# Patient Record
Sex: MALE | Race: BLACK OR AFRICAN AMERICAN | Employment: OTHER | URBAN - METROPOLITAN AREA
[De-identification: names, ages, dates, MRNs, and addresses within clinical notes are randomized per-mention and may not be internally consistent; named-entity substitution may affect disease eponyms.]

---

## 2017-12-16 ENCOUNTER — HOSPITAL ENCOUNTER (OUTPATIENT)
Dept: CT IMAGING | Facility: HOSPITAL | Age: 77
Discharge: HOME | End: 2017-12-16
Attending: NURSE PRACTITIONER
Payer: MEDICARE

## 2017-12-16 DIAGNOSIS — G40.309 GENERALIZED CONVULSIVE EPILEPSY (HCC): ICD-10-CM

## 2017-12-16 DIAGNOSIS — R41.0 CONFUSION: ICD-10-CM

## 2017-12-16 PROCEDURE — 70450 CT HEAD/BRAIN W/O DYE: CPT | Performed by: NURSE PRACTITIONER

## 2018-01-09 PROCEDURE — 84153 ASSAY OF PSA TOTAL: CPT | Performed by: INTERNAL MEDICINE

## 2018-01-09 PROCEDURE — 84154 ASSAY OF PSA FREE: CPT | Performed by: INTERNAL MEDICINE

## 2018-01-09 PROCEDURE — 80177 DRUG SCRN QUAN LEVETIRACETAM: CPT | Performed by: NURSE PRACTITIONER

## 2018-03-03 ENCOUNTER — HOSPITAL ENCOUNTER (OUTPATIENT)
Dept: CV DIAGNOSTICS | Facility: HOSPITAL | Age: 78
Discharge: HOME | End: 2018-03-03
Attending: INTERNAL MEDICINE
Payer: MEDICARE

## 2018-03-03 DIAGNOSIS — I35.0 NONRHEUMATIC AORTIC VALVE STENOSIS: ICD-10-CM

## 2018-03-03 PROCEDURE — 93306 TTE W/DOPPLER COMPLETE: CPT | Performed by: INTERNAL MEDICINE

## 2018-03-09 NOTE — PROGRESS NOTES
Called patient [phone] (home)   Spoke to patient daughter and guardian Rosendo Mortensen (ok per hipaa)  Good understanding verbalized  Number given to schedule with cardiology

## 2018-04-08 ENCOUNTER — APPOINTMENT (OUTPATIENT)
Dept: GENERAL RADIOLOGY | Facility: HOSPITAL | Age: 78
End: 2018-04-08
Attending: EMERGENCY MEDICINE
Payer: MEDICARE

## 2018-04-08 ENCOUNTER — HOSPITAL ENCOUNTER (OUTPATIENT)
Facility: HOSPITAL | Age: 78
Setting detail: OBSERVATION
Discharge: HOME HEALTH | End: 2018-04-10
Attending: EMERGENCY MEDICINE | Admitting: INTERNAL MEDICINE
Payer: MEDICARE

## 2018-04-08 DIAGNOSIS — I48.91 ATRIAL FIBRILLATION WITH RVR (HCC): Primary | ICD-10-CM

## 2018-04-08 PROCEDURE — 71045 X-RAY EXAM CHEST 1 VIEW: CPT | Performed by: EMERGENCY MEDICINE

## 2018-04-08 RX ORDER — WARFARIN SODIUM 2.5 MG/1
1.25 TABLET ORAL
Status: COMPLETED | OUTPATIENT
Start: 2018-04-08 | End: 2018-04-08

## 2018-04-08 RX ORDER — PANTOPRAZOLE SODIUM 20 MG/1
20 TABLET, DELAYED RELEASE ORAL
Status: DISCONTINUED | OUTPATIENT
Start: 2018-04-09 | End: 2018-04-10

## 2018-04-08 RX ORDER — LORAZEPAM 0.5 MG/1
0.5 TABLET ORAL 2 TIMES DAILY PRN
Status: DISCONTINUED | OUTPATIENT
Start: 2018-04-08 | End: 2018-04-10

## 2018-04-08 RX ORDER — FOLIC ACID 1 MG/1
1 TABLET ORAL
Status: DISCONTINUED | OUTPATIENT
Start: 2018-04-09 | End: 2018-04-10

## 2018-04-08 RX ORDER — ACETAMINOPHEN 160 MG
2000 TABLET,DISINTEGRATING ORAL DAILY
Status: DISCONTINUED | OUTPATIENT
Start: 2018-04-09 | End: 2018-04-10

## 2018-04-08 RX ORDER — RIVASTIGMINE 9.5 MG/24H
1 PATCH, EXTENDED RELEASE TRANSDERMAL DAILY
Status: DISCONTINUED | OUTPATIENT
Start: 2018-04-09 | End: 2018-04-10

## 2018-04-08 RX ORDER — ONDANSETRON 2 MG/ML
4 INJECTION INTRAMUSCULAR; INTRAVENOUS EVERY 6 HOURS PRN
Status: DISCONTINUED | OUTPATIENT
Start: 2018-04-08 | End: 2018-04-10

## 2018-04-08 RX ORDER — ACETAMINOPHEN 325 MG/1
650 TABLET ORAL EVERY 6 HOURS PRN
Status: DISCONTINUED | OUTPATIENT
Start: 2018-04-08 | End: 2018-04-10

## 2018-04-08 RX ORDER — ASPIRIN 81 MG/1
81 TABLET, CHEWABLE ORAL DAILY
Status: DISCONTINUED | OUTPATIENT
Start: 2018-04-08 | End: 2018-04-10

## 2018-04-08 RX ORDER — CALCIUM CARBONATE 200(500)MG
500 TABLET,CHEWABLE ORAL DAILY PRN
Status: DISCONTINUED | OUTPATIENT
Start: 2018-04-08 | End: 2018-04-10

## 2018-04-08 RX ORDER — LEVETIRACETAM 500 MG/1
500 TABLET ORAL 2 TIMES DAILY
Status: DISCONTINUED | OUTPATIENT
Start: 2018-04-08 | End: 2018-04-10

## 2018-04-08 NOTE — ED NOTES
Assuming care of pt at this time. Pt receiving a 250mls 0.9NS bolus. Cardizem gtts infusing at 2.5 mg/hr. Pt A/Ox2 at this time. Daughter at bedside sts this is normal for pt. Pt has dementia, lives at home with daughter.  Daughter sts SBP was in the 80s at

## 2018-04-08 NOTE — ED PROVIDER NOTES
Patient Seen in: [facility] Emergency Department    History   Patient presents with:  Hypotension (cardiovascular)    Stated Complaint:  LOW BLOOD PRESSURE    HPI    Consuelo Swanson is a pleasant 51-year-old male coming with complaints of low blood pressure. exam shows tachycardia abdomen soft nontender tremors cyanosis or edema no rash       ED Course     Labs Reviewed   COMP METABOLIC PANEL (14) - Abnormal; Notable for the following:        Result Value    Glucose 199 (*)     BUN 24 (*)     Creatinine 10.10 bolus as well as starting Cardizem with a small bolus and patient has since converted.   Patient has no history of atrial fibrillation patient will be admitted at this time and was discussed with cardiology      Admission disposition: 4/8/2018  5:55 PM

## 2018-04-08 NOTE — ED NOTES
Round on pt. Pt and family updated on room number and eta to floor. No distress noted. Pt requests food tray. MD approves pt to eat. Food tray ordered.

## 2018-04-09 PROCEDURE — 99222 1ST HOSP IP/OBS MODERATE 55: CPT | Performed by: INTERNAL MEDICINE

## 2018-04-09 RX ORDER — METOPROLOL TARTRATE 50 MG/1
50 TABLET, FILM COATED ORAL
Status: DISCONTINUED | OUTPATIENT
Start: 2018-04-09 | End: 2018-04-09

## 2018-04-09 RX ORDER — HEPARIN SODIUM 1000 [USP'U]/ML
1.5 INJECTION, SOLUTION INTRAVENOUS; SUBCUTANEOUS ONCE
Status: DISCONTINUED | OUTPATIENT
Start: 2018-04-09 | End: 2018-04-09

## 2018-04-09 RX ORDER — LIDOCAINE AND PRILOCAINE 25; 25 MG/G; MG/G
CREAM TOPICAL AS NEEDED
Status: DISCONTINUED | OUTPATIENT
Start: 2018-04-09 | End: 2018-04-10

## 2018-04-09 RX ORDER — WARFARIN SODIUM 2.5 MG/1
2.5 TABLET ORAL
Status: COMPLETED | OUTPATIENT
Start: 2018-04-09 | End: 2018-04-09

## 2018-04-09 RX ORDER — VERAPAMIL HYDROCHLORIDE 2.5 MG/ML
5 INJECTION, SOLUTION INTRAVENOUS ONCE
Status: COMPLETED | OUTPATIENT
Start: 2018-04-09 | End: 2018-04-09

## 2018-04-09 RX ORDER — ALBUMIN (HUMAN) 12.5 G/50ML
100 SOLUTION INTRAVENOUS AS NEEDED
Status: DISCONTINUED | OUTPATIENT
Start: 2018-04-09 | End: 2018-04-10

## 2018-04-09 NOTE — H&P
Patient presents with:  Hypotension (cardiovascular)       PCP: Brionna Hameed MD      History of Present Illness: Patient is a 78year old male with PMH sig for hx of ESRD on HD, hx of DVT on coumadin, Dementia, DM, GERD, hx of Valvular heart diseas Smoking status: Never Smoker    Smokeless tobacco: Never Used    Alcohol use No        Fam Hx  Family History   Problem Relation Age of Onset   • Hypertension Father        Review of Systems  Comprehensive ROS reviewed and negative except for what i ago  - controlled on keppra       FEN:  - General diet  - Lytes prn    Proph:  - DVT proph with pt on coumadin     Dispo:  - [facility] hospitalist service  - Consults include: cardiology       [facility] hospitalist to continue to follow patient while in house    Full

## 2018-04-09 NOTE — CONSULTS
[facility] Consultation    Lincoln Gonzalez.  Patient Status:  Observation    10/18/1940 MRN [id number]   [facility] 7NE-A Attending Jeannette Bui MD   Hosp Day # 0 PCP Margarita Lowery MD     Reason for Consultation:  Atrial Fibr drugs.    Review of Systems:  All systems were reviewed and are negative except as described above in HPI.     Physical Exam:      Wt Readings from Last 3 Encounters:  04/08/18 : 135 lb (61.2 kg)  03/27/18 : 135 lb (61.2 kg)  02/15/18 : 143 lb (64.9 kg) Elevation the right hemidiaphragm. Mild right basilar atelectasis. Cardiac silhouette is upper normal in size. Aortic atherosclerosis. Ectatic and tortuous thoracic aorta, unchanged. Minimal blunting of the right costophrenic angle.   No   measurable p

## 2018-04-09 NOTE — PLAN OF CARE
Assumed care at 0700. A/o x 2-3, forgetful, nonsensical at times. RA  NSR on tele, @ 1020 pt went into SVT, sustaining, w/ HR in 150's. /79. Asymptomatic. EKG confirmed, on chart.    Delmis WORRELL notified, received call from Dr. Jaleesa Ospina to give

## 2018-04-09 NOTE — HOME CARE LIAISON
I met with patient and family at bedside. Care discussed with patient and daughter Baron Gong. Has history with [facility] and agreeable to another episode. Requesting [facility] for physical therapy.   Thank you for the referral.

## 2018-04-09 NOTE — CONSULTS
[facility] Dosing Service  Warfarin (Coumadin) Initial Dosing    Nora Grajeda is a 78year old male for whom pharmacy has been consulted to dose warfarin (COUMADIN) for Prophylaxis of venous thrombosis by Dr. Lisa Leach.   Based on this indicatio

## 2018-04-09 NOTE — PROGRESS NOTES
[facility] Dosing Service  Warfarin (Coumadin) Subsequent Dosing    Yolettemartantonio Anderson. is a 78year old male for whom pharmacy has been dosing warfarin (Coumadin).  Goal INR is 2-3    Recent Labs   Lab  04/08/18   1635  04/09/18   0514   INR  2.51*

## 2018-04-09 NOTE — CONSULTS
[facility]  Report of Consultation    Macey Larose.  Patient Status:  Observation    10/18/1940 MRN [id number]   [facility] 7NE-A Attending Blaine Abarca MD   Hosp Day # 0 PCP Daija Vargas MD     Reason for Consultation: 2.5 mg, Oral, Once at night  •  aspirin chewable tab 81 mg, 81 mg, Oral, Daily  •  Calcium Carbonate Antacid (TUMS) chewable tab 500 mg, 500 mg, Oral, Daily PRN  •  Vitamin D3 (VITAMIN D3) cap 2,000 Units, 2,000 Units, Oral, Daily  •  folic acid (FOLVITE) 0.5 04/08/2018   TP 8.0 04/08/2018   AST 25 04/08/2018   ALT 13 04/08/2018   PTT 52.4 04/08/2018   INR 2.65 04/09/2018   PTP 29.1 04/09/2018   TSH 1.620 04/09/2018   TROP <0.046 04/08/2018            BUN (mg/dL)   Date Value   04/09/2018 28 (H)   04/08/201

## 2018-04-09 NOTE — ED NOTES
Report given to [facility] . Will send for transport at [address]. Round on pt. Updated on eta to floor.  Food tray delivered to pt

## 2018-04-09 NOTE — PLAN OF CARE
NURSING ADMISSION NOTE  Jinny Lam.  10/18/1940    Patient admitted via [address] to room. Safety precautions initiated. Bed in low position. Call light in reach.   /59 (BP Location: Left arm)   Pulse 69   Temp 97.9 °F (36.6 °C

## 2018-04-09 NOTE — CM/SW NOTE
Pt is a 79 yo male admitted for low BP and afib w/RVR. Pt lives with his dtr in [facility]. Pt goes to HD for ESRD to [facility] on MWF. Pt has a history with [facility] . Pt would like [facility] again - referral made to [facility].   They can acce

## 2018-04-10 VITALS
RESPIRATION RATE: 18 BRPM | HEART RATE: 86 BPM | WEIGHT: 142.19 LBS | SYSTOLIC BLOOD PRESSURE: 119 MMHG | BODY MASS INDEX: 24 KG/M2 | DIASTOLIC BLOOD PRESSURE: 67 MMHG | TEMPERATURE: 97 F | OXYGEN SATURATION: 100 %

## 2018-04-10 RX ORDER — WARFARIN SODIUM 2.5 MG/1
1.25 TABLET ORAL
Status: DISCONTINUED | OUTPATIENT
Start: 2018-04-10 | End: 2018-04-10

## 2018-04-10 NOTE — PROGRESS NOTES
[facility]  Progress Note    Gloria Edwar.  Patient Status:  Observation    10/18/1940 MRN [id number]   [facility] 7NE-A Attending Kana Officer, MD   Hosp Day # 0 PCP Tito Carter MD     Had HD late into the morning today mood and affect.     Recent Labs   04/08/18  1635 04/09/18  0514   WBC 5.9 5.9   HGB 11.1* 10.0*   MCV 94.8 92.6   .0 228.0   INR 2.51* 2.65*         Recent Labs   04/08/18  1635 04/09/18  0514 04/10/18  0455    140 139   K 4.0 4.0 3.5*   CL 97

## 2018-04-10 NOTE — PLAN OF CARE
Assumed care at 0730. Pt A&O x2. On room air. NSR on tele. PRN ativan given for anxiety this am. Ambulated in hallway with PT. R arm precautions for AV fistula. K 3.5 this morning, Dr. Milla Rock notified- no new orders. Sz precautions maintained.  Safety preca

## 2018-04-10 NOTE — PLAN OF CARE
Patient alert and oriented times 2-3 at times. Confused at times at night. Reoriented to his surroundings. Kept pulling tele monitor off. Patient reoriented easily. Dialysis from 8:30 till about 1:30am.  1.2L taken off. Meds given per MAR.   Bed in lo

## 2018-04-10 NOTE — PROGRESS NOTES
[facility] Cardiology Progress Note        Macey Larose.  Patient Status:  Observation    10/18/1940 MRN [id number]   [facility] 7NE-A Attending Blaine Abarca MD   Hosp Day # 0 PCP Daija Vargas MD     Sub murmur. Lungs: Clear to auscultation and percussion. Abdomen: Soft, non-tender. Extremities: No LE edema. No clubbing or cyanosis. Neurologic: Alert and oriented, normal affect. Skin: Warm and dry.            LABS:      HEM:  Recent Labs   Lab  04/ (S, est): 50mm Hg. 3. Aortic valve: Mild calcified and thickened trileaflet aortic valve with     mild stenosis. Mild regurgitation. Peak velocity (S): 2.6m/sec. Mean     gradient (S): 13mm Hg. Valve area (VTI): 1.39cm^2.  4. Mitral valve:  Moderately calc

## 2018-04-10 NOTE — CONSULTS
[facility] Electrophysiology Consult      Carline Damon.  Patient Status:  Observation    10/18/1940 MRN [id number]   [facility] 7NE-A Attending Blanquita Gloria MD   Hosp Day # 0 PCP MD Leonardo Kaufman Tab TAKE ONE OR ONE AND ONE-HALF TABLETS DAILY AS DIRECTED Disp: 145 tablet Rfl: 1   RIVASTIGMINE 9.5 MG/24HR Transdermal Patch 24 Hr APPLY 1 PATCH TO SKIN DAILY Disp: 90 patch Rfl: 1   OMEPRAZOLE 20 MG Oral Capsule Delayed Release TAKE 1 CAPSULE EVERY MOR no JVD, no carotid bruits, no thyromegaly  RESPIRATORY: clear to auscultation  CARDIOVASCULAR: S1, S2 normal, RRR; no S3, no S4; no click; no murmur  ABDOMEN: normal active BS, soft, nondistended; nontender  EXTREMITIES: no cyanosis, clubbing or edema, per observation and Valsalva, daily medications, and catheter ablation.   · He would like to continue medications  · Verapamil and metoprolol  · In discussing with daughter at bedside, plan 30d event monitor to ensure he is not having episodes          Jolly Woody

## 2018-04-10 NOTE — PHYSICAL THERAPY NOTE
PHYSICAL THERAPY QUICK EVALUATION - INPATIENT    Room Number: 7102/0015-M  Evaluation Date: 4/10/2018  Presenting Problem: A-fib with RVR  Physician Order: PT Eval and Treat     Pt is 78year old male admitted on 4/8/2018 from home with c/o new onset PSV patient currently have. ..  -   Turning over in bed (including adjusting bedclothes, sheets and blankets)?: None   -   Sitting down on and standing up from a chair with arms (e.g., wheelchair, bedside commode, etc.): A Little   -   Moving from lying on back Therapy needs at this time. Patient discharged from Physical Therapy services. Please re-order if a new functional limitation presents during this admission. GOALS  Patient was able to achieve the following goals . ..     Patient was able to transfer At

## 2018-04-11 NOTE — CM/SW NOTE
04/11/18 0900   Discharge disposition   Expected discharge disposition Home-Health   Name of Facillity/Home Care/Hospice [facility]   Home services after discharge Skilled home care   Discharge transportation Private car

## 2018-04-11 NOTE — OCCUPATIONAL THERAPY NOTE
OCCUPATIONAL THERAPY QUICK EVALUATION - INPATIENT    Room Number: 2369/6813-H  Evaluation Date: 4/11/2018     Type of Evaluation: Quick Eval  Presenting Problem: A-fib    Physician Order: IP Consult to Occupational Therapy  Reason for Therapy:  ADL/IADL Dy Impaired safety awareness.      RANGE OF MOTION AND STRENGTH ASSESSMENT  Upper extremity ROM is within functional limits     Upper extremity strength is within functional limits     NEUROLOGICAL FINDINGS                   ACTIVITIES OF DAILY LIVING ASSESSME records    Specific performance deficits impacting engagement in ADL/IADL  LOW  1 - 3 performance deficits    Client Assessment/Performance Deficits  LOW - No comorbidities nor modifications of tasks    Clinical Decision Making  LOW - Analysis of occupatio

## 2018-04-18 NOTE — DISCHARGE SUMMARY
General Medicine Discharge Summary     Patient ID:  Ciro Burnett.  78year old  10/18/1940    Admit date: 4/8/2018    Discharge date and time: 4/10/2018  4:18 PM     Attending Physician: Татьяна bourne Consults: IP CONSULT TO NEPHROLOGY  IP CONSULT TO PHYSICAL THERAPY  IP CONSULT TO PHYSICAL THERAPY  IP CONSULT TO SOCIAL WORK    Operative Procedures:        Patient instructions:      Discharge Medication List as of 4/10/2018  3:38 PM    START taking BS  Extremities: no pedal edema   Neuro: CN inact, no focal deficits      Total time coordinating care for discharge: 39 min     Sonido Tolentino MD  [facility] Hospitalist  [phone]

## 2018-07-11 ENCOUNTER — APPOINTMENT (OUTPATIENT)
Dept: GENERAL RADIOLOGY | Facility: HOSPITAL | Age: 78
End: 2018-07-11
Attending: EMERGENCY MEDICINE
Payer: MEDICARE

## 2018-07-11 ENCOUNTER — HOSPITAL ENCOUNTER (OUTPATIENT)
Facility: HOSPITAL | Age: 78
Setting detail: OBSERVATION
LOS: 1 days | Discharge: HOME | End: 2018-07-12
Attending: EMERGENCY MEDICINE | Admitting: INTERNAL MEDICINE
Payer: MEDICARE

## 2018-07-11 DIAGNOSIS — I47.1 SVT (SUPRAVENTRICULAR TACHYCARDIA) (HCC): Primary | ICD-10-CM

## 2018-07-11 DIAGNOSIS — I95.9 HYPOTENSION, UNSPECIFIED HYPOTENSION TYPE: ICD-10-CM

## 2018-07-11 PROBLEM — D64.9 ANEMIA: Status: ACTIVE | Noted: 2018-07-11

## 2018-07-11 PROBLEM — N17.9 ACUTE KIDNEY INJURY (HCC): Status: ACTIVE | Noted: 2018-07-11

## 2018-07-11 PROBLEM — N17.9 ACUTE RENAL FAILURE (ARF) (HCC): Status: ACTIVE | Noted: 2018-07-11

## 2018-07-11 PROBLEM — R73.9 HYPERGLYCEMIA: Status: ACTIVE | Noted: 2018-07-11

## 2018-07-11 LAB
ALBUMIN SERPL-MCNC: 3.3 G/DL (ref 3.5–4.8)
ALP LIVER SERPL-CCNC: 74 U/L (ref 45–117)
ALT SERPL-CCNC: 15 U/L (ref 17–63)
APTT PPP: 36.6 SECONDS (ref 26.1–34.6)
AST SERPL-CCNC: 36 U/L (ref 15–41)
BASOPHILS # BLD AUTO: 0.05 X10(3) UL (ref 0–0.1)
BASOPHILS NFR BLD AUTO: 0.9 %
BILIRUB SERPL-MCNC: 0.7 MG/DL (ref 0.1–2)
BUN BLD-MCNC: 20 MG/DL (ref 8–20)
CALCIUM BLD-MCNC: 8.7 MG/DL (ref 8.3–10.3)
CHLORIDE: 100 MMOL/L (ref 101–111)
CO2: 22 MMOL/L (ref 22–32)
CREAT BLD-MCNC: 9.67 MG/DL (ref 0.7–1.3)
EOSINOPHIL # BLD AUTO: 0.15 X10(3) UL (ref 0–0.3)
EOSINOPHIL NFR BLD AUTO: 2.6 %
ERYTHROCYTE [DISTWIDTH] IN BLOOD BY AUTOMATED COUNT: 15.3 % (ref 11.5–16)
GLUCOSE BLD-MCNC: 144 MG/DL (ref 70–99)
HCT VFR BLD AUTO: 28.6 % (ref 37–53)
HGB BLD-MCNC: 9.1 G/DL (ref 13–17)
IMMATURE GRANULOCYTE COUNT: 0.03 X10(3) UL (ref 0–1)
IMMATURE GRANULOCYTE RATIO %: 0.5 %
INR BLD: 1.85 (ref 0.9–1.1)
LYMPHOCYTES # BLD AUTO: 2.33 X10(3) UL (ref 0.9–4)
LYMPHOCYTES NFR BLD AUTO: 40.3 %
M PROTEIN MFR SERPL ELPH: 8.3 G/DL (ref 6.1–8.3)
MCH RBC QN AUTO: 28.1 PG (ref 27–33.2)
MCHC RBC AUTO-ENTMCNC: 31.8 G/DL (ref 31–37)
MCV RBC AUTO: 88.3 FL (ref 80–99)
MONOCYTES # BLD AUTO: 0.45 X10(3) UL (ref 0.1–1)
MONOCYTES NFR BLD AUTO: 7.8 %
NEUTROPHIL ABS PRELIM: 2.77 X10 (3) UL (ref 1.3–6.7)
NEUTROPHILS # BLD AUTO: 2.77 X10(3) UL (ref 1.3–6.7)
NEUTROPHILS NFR BLD AUTO: 47.9 %
PLATELET # BLD AUTO: 283 10(3)UL (ref 150–450)
POTASSIUM SERPL-SCNC: 4.7 MMOL/L (ref 3.6–5.1)
PSA SERPL DL<=0.01 NG/ML-MCNC: 22 SECONDS (ref 12.4–14.7)
RBC # BLD AUTO: 3.24 X10(6)UL (ref 3.8–5.8)
RED CELL DISTRIBUTION WIDTH-SD: 49 FL (ref 35.1–46.3)
SODIUM SERPL-SCNC: 136 MMOL/L (ref 136–144)
TROPONIN: <0.046 NG/ML (ref ?–0.05)
TSI SER-ACNC: 2.34 MIU/ML (ref 0.35–5.5)
WBC # BLD AUTO: 5.8 X10(3) UL (ref 4–13)

## 2018-07-11 PROCEDURE — 71045 X-RAY EXAM CHEST 1 VIEW: CPT | Performed by: EMERGENCY MEDICINE

## 2018-07-11 RX ORDER — LEVETIRACETAM 250 MG/1
250 TABLET ORAL 2 TIMES DAILY
Status: DISCONTINUED | OUTPATIENT
Start: 2018-07-12 | End: 2018-07-12

## 2018-07-11 RX ORDER — PANTOPRAZOLE SODIUM 20 MG/1
20 TABLET, DELAYED RELEASE ORAL
Status: DISCONTINUED | OUTPATIENT
Start: 2018-07-12 | End: 2018-07-12

## 2018-07-11 RX ORDER — WARFARIN SODIUM 2.5 MG/1
2.5 TABLET ORAL NIGHTLY
Status: DISCONTINUED | OUTPATIENT
Start: 2018-07-12 | End: 2018-07-12

## 2018-07-11 RX ORDER — LORAZEPAM 0.5 MG/1
0.5 TABLET ORAL EVERY 6 HOURS PRN
Status: DISCONTINUED | OUTPATIENT
Start: 2018-07-11 | End: 2018-07-12

## 2018-07-11 RX ORDER — ASPIRIN 81 MG/1
81 TABLET, CHEWABLE ORAL
Status: DISCONTINUED | OUTPATIENT
Start: 2018-07-12 | End: 2018-07-12

## 2018-07-11 RX ORDER — BISACODYL 10 MG
10 SUPPOSITORY, RECTAL RECTAL
Status: DISCONTINUED | OUTPATIENT
Start: 2018-07-11 | End: 2018-07-12

## 2018-07-11 RX ORDER — POLYETHYLENE GLYCOL 3350 17 G/17G
17 POWDER, FOR SOLUTION ORAL DAILY PRN
Status: DISCONTINUED | OUTPATIENT
Start: 2018-07-11 | End: 2018-07-12

## 2018-07-11 RX ORDER — DILTIAZEM HYDROCHLORIDE 5 MG/ML
5 INJECTION INTRAVENOUS ONCE
Status: COMPLETED | OUTPATIENT
Start: 2018-07-11 | End: 2018-07-11

## 2018-07-11 RX ORDER — ONDANSETRON 2 MG/ML
4 INJECTION INTRAMUSCULAR; INTRAVENOUS EVERY 4 HOURS PRN
Status: DISCONTINUED | OUTPATIENT
Start: 2018-07-11 | End: 2018-07-12

## 2018-07-11 RX ORDER — RIVASTIGMINE 9.5 MG/24H
1 PATCH, EXTENDED RELEASE TRANSDERMAL DAILY
Status: DISCONTINUED | OUTPATIENT
Start: 2018-07-12 | End: 2018-07-12

## 2018-07-11 RX ORDER — ACETAMINOPHEN 325 MG/1
650 TABLET ORAL EVERY 6 HOURS PRN
Status: DISCONTINUED | OUTPATIENT
Start: 2018-07-11 | End: 2018-07-12

## 2018-07-11 RX ORDER — DOCUSATE SODIUM 100 MG/1
100 CAPSULE, LIQUID FILLED ORAL 2 TIMES DAILY
Status: DISCONTINUED | OUTPATIENT
Start: 2018-07-11 | End: 2018-07-12

## 2018-07-11 NOTE — ED NOTES
Daughter now at bedside. Updated on plan of care. Blood pressure improving. She states he has a history of dementia and is at his neurologic baseline. Patient appears comfortable at this time.

## 2018-07-11 NOTE — ED NOTES
MD performed carotid massage and patient had compensatory pause and converted to sinus rhythm. BP 95/54. Patient asymptomatic at this time. HR now in the 80s. Will continue to monitor.

## 2018-07-11 NOTE — ED INITIAL ASSESSMENT (HPI)
Patient presents with elevated heart rate and low blood pressure. He is coming from dialysis. He is dialyzed M/W/F. Blood pressure for /70. BP on arrival 93/61.  and regular.

## 2018-07-11 NOTE — ED NOTES
MD at bedside for hypotension. Patient is alert and oriented but forgetful and says inappropriate things like he is 600 lbs. Unable to answer certain questions correctly. Denies any symptoms at this time. MD wants fluid bolus and to continue with cardizem.

## 2018-07-11 NOTE — ED PROVIDER NOTES
Patient Seen in: [facility] Emergency Department    History   Patient presents with:  Hypotension (cardiovascular)    Stated Complaint: low BP at dialysis?  /70 for EMS    HPI    Patient is a pleasant 43-year-old male, with multiple past medica 133/81   Pulse 84   Temp 97.4 °F (36.3 °C) (Temporal)   Resp 15   Ht 165.1 cm (5' 5\")   Wt 60 kg   SpO2 91%   BMI 22.01 kg/m²       Physical Exam    Vital signs noted.    GENERAL: Patient is awake and alert, resting comfortably on the cart, in no apparent orders were created for panel order CBC WITH DIFFERENTIAL WITH PLATELET.   Procedure                               Abnormality         Status                     ---------                               -----------         ------                     CBC W/ D studies were obtained. Cardizem drip ordered. Cardizem drip was initiated. Carotid massage performed. Patient spontaneously converted to sinus rhythm. Blood pressure remained tentative. Cardizem drip held.     Results of the patient's laboratory and

## 2018-07-12 VITALS
HEART RATE: 86 BPM | BODY MASS INDEX: 22.6 KG/M2 | TEMPERATURE: 98 F | WEIGHT: 135.63 LBS | OXYGEN SATURATION: 96 % | SYSTOLIC BLOOD PRESSURE: 124 MMHG | RESPIRATION RATE: 19 BRPM | DIASTOLIC BLOOD PRESSURE: 77 MMHG | HEIGHT: 65 IN

## 2018-07-12 PROBLEM — N18.6 ANEMIA IN ESRD (END-STAGE RENAL DISEASE) (HCC): Status: ACTIVE | Noted: 2018-07-11

## 2018-07-12 PROBLEM — D63.1 ANEMIA IN ESRD (END-STAGE RENAL DISEASE) (HCC): Status: ACTIVE | Noted: 2018-07-11

## 2018-07-12 LAB
ATRIAL RATE: 141 BPM
BASOPHILS # BLD AUTO: 0.05 X10(3) UL (ref 0–0.1)
BASOPHILS NFR BLD AUTO: 0.8 %
BUN BLD-MCNC: 24 MG/DL (ref 8–20)
CALCIUM BLD-MCNC: 8.3 MG/DL (ref 8.3–10.3)
CHLORIDE: 102 MMOL/L (ref 101–111)
CO2: 26 MMOL/L (ref 22–32)
CREAT BLD-MCNC: 10.1 MG/DL (ref 0.7–1.3)
EOSINOPHIL # BLD AUTO: 0.22 X10(3) UL (ref 0–0.3)
EOSINOPHIL NFR BLD AUTO: 3.4 %
ERYTHROCYTE [DISTWIDTH] IN BLOOD BY AUTOMATED COUNT: 15.2 % (ref 11.5–16)
GLUCOSE BLD-MCNC: 109 MG/DL (ref 70–99)
HAV IGM SER QL: 2.1 MG/DL (ref 1.8–2.5)
HCT VFR BLD AUTO: 29.5 % (ref 37–53)
HGB BLD-MCNC: 9.1 G/DL (ref 13–17)
IMMATURE GRANULOCYTE COUNT: 0.02 X10(3) UL (ref 0–1)
IMMATURE GRANULOCYTE RATIO %: 0.3 %
INR BLD: 1.94 (ref 0.9–1.1)
LYMPHOCYTES # BLD AUTO: 2.61 X10(3) UL (ref 0.9–4)
LYMPHOCYTES NFR BLD AUTO: 40.8 %
MCH RBC QN AUTO: 27.4 PG (ref 27–33.2)
MCHC RBC AUTO-ENTMCNC: 30.8 G/DL (ref 31–37)
MCV RBC AUTO: 88.9 FL (ref 80–99)
MONOCYTES # BLD AUTO: 0.44 X10(3) UL (ref 0.1–1)
MONOCYTES NFR BLD AUTO: 6.9 %
NEUTROPHIL ABS PRELIM: 3.06 X10 (3) UL (ref 1.3–6.7)
NEUTROPHILS # BLD AUTO: 3.06 X10(3) UL (ref 1.3–6.7)
NEUTROPHILS NFR BLD AUTO: 47.8 %
PLATELET # BLD AUTO: 231 10(3)UL (ref 150–450)
POTASSIUM SERPL-SCNC: 4.3 MMOL/L (ref 3.6–5.1)
PSA SERPL DL<=0.01 NG/ML-MCNC: 21.8 SECONDS (ref 12–14.1)
Q-T INTERVAL: 348 MS
QRS DURATION: 114 MS
QTC CALCULATION (BEZET): 531 MS
R AXIS: -35 DEGREES
RBC # BLD AUTO: 3.32 X10(6)UL (ref 3.8–5.8)
RED CELL DISTRIBUTION WIDTH-SD: 50 FL (ref 35.1–46.3)
SODIUM SERPL-SCNC: 138 MMOL/L (ref 136–144)
T AXIS: 125 DEGREES
VENTRICULAR RATE: 140 BPM
WBC # BLD AUTO: 6.4 X10(3) UL (ref 4–13)

## 2018-07-12 PROCEDURE — 99222 1ST HOSP IP/OBS MODERATE 55: CPT | Performed by: INTERNAL MEDICINE

## 2018-07-12 PROCEDURE — 5A1D70Z PERFORMANCE OF URINARY FILTRATION, INTERMITTENT, LESS THAN 6 HOURS PER DAY: ICD-10-PCS | Performed by: EMERGENCY MEDICINE

## 2018-07-12 RX ORDER — METOPROLOL TARTRATE 50 MG/1
50 TABLET, FILM COATED ORAL
Status: DISCONTINUED | OUTPATIENT
Start: 2018-07-12 | End: 2018-07-12

## 2018-07-12 NOTE — H&P
[facility] Hospitalist History and Physical      CC: SVT    PCP: Ade Dimas MD    History of Present Illness: Patient is a 78year old male with PMH sig for ESRD, DM, SVT here after noted to have low BP at HD. Found to be in SVT in the ER.  Treated with ca TO SKIN DAILY Disp: 90 patch Rfl: 1   metoprolol Tartrate 25 MG Oral Tab Take 1 tablet (25 mg total) by mouth 2 (two) times daily.  Disp: 180 tablet Rfl: 3   Calcium Carbonate Antacid 500 MG Oral Chew Tab Chew 1 tablet by mouth daily as needed for Heartburn 07/12/2018   CA 8.3 07/12/2018   ALB 3.3 07/11/2018   ALKPHO 74 07/11/2018   BILT 0.7 07/11/2018   TP 8.3 07/11/2018   AST 36 07/11/2018   ALT 15 07/11/2018   PTT 36.6 07/11/2018   INR 1.94 07/12/2018   PTP 21.8 07/12/2018   TSH 2.340 07/11/2018   MG 2.1 0

## 2018-07-12 NOTE — OCCUPATIONAL THERAPY NOTE
IP OT attempt to see patient for therapeutic assessment/treatment. Patient has yet to be assessed by Cardiology or Hospitalist.  Will HOLD at this time and attempt again as able.     Cathy Li, HANNAH, OTR/L  Master of Occupational Therapy  Occupational

## 2018-07-12 NOTE — PLAN OF CARE
CARDIOVASCULAR - ADULT    • Maintains optimal cardiac output and hemodynamic stability Progressing    • Absence of cardiac arrhythmias or at baseline Progressing        Cardiac monitor shows. ... SR BBB  Alert and o x 1-2 , Hx of Dementia  /81.  HR at 7

## 2018-07-12 NOTE — PHYSICAL THERAPY NOTE
Orders received for PT evaluation via mobility screening. No H&P in chart. Cardiology consult pending. Will hold PT at this time follow-up as appropriate.

## 2018-07-12 NOTE — PROGRESS NOTES
[address] Cardiology  Report of Consultation    Cleveland Mcdermotto.  Patient Status:  Observation    10/18/1940 MRN [id number]   [facility] 8NE-A Attending Ron Pierce,*   Hosp Day # 1 PCP Roque Bowser MD Warfarin Sodium  2.5 mg Oral Nightly       Continuous Infusion:   • diltiazem Stopped (07/11/18 8672)       PRN Medications:   ondansetron HCl, acetaminophen, PEG 3350, bisacodyl, LORazepam    Outpatient Medications:     No current facility-administered me Temp: 98.2 °F (36.8 °C)     Wt Readings from Last 3 Encounters:  07/12/18 : 135 lb 9.6 oz (61.5 kg)  06/12/18 : 141 lb (64 kg)  06/12/18 : 141 lb 1.5 oz (64 kg)          General: Well developed, well nourished male. Pt is in no acute distress.   HEENT: 07/11/18   1704   TROP  <0.046       Diagnostics:   Tele: Sinus. EKG: LBBB. SVT in ER. Echo: Normal EF. Mild AS. RVSP 50mmHg. 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 ESRD  3. HTN  4. PAF (on coumadin)  5. Hx DVT      Plan:  1. SVT: Now sinus.   In

## 2018-07-12 NOTE — CM/SW NOTE
COND 44: Patient failed Inpatient criteria. Second level of review completed and supports Observation. UR committee in agreement. Discussed with Dr Juwan Zendejas approves.

## 2018-07-12 NOTE — PLAN OF CARE
Received patient at . Alert and Oriented x3, is forgetful. Tele Rhythm NSR with BBB. O2 saturation 96% On room air. Breath sounds diminished but clear. Bed is locked and in low position. Dialysis in progress that completed 1750.  Pt is feeling Ok and r

## 2018-07-12 NOTE — CONSULTS
[facility]  Report of Consultation    Naga Coy.  Patient Status:  Inpatient    10/18/1940 MRN [id number]   [facility] 8NE-A Attending Rick Valle, [address] Day # 1 PCP Cathy Acevedo MD     Reason for [facility] injection 4 mg, 4 mg, Intravenous, Q4H PRN  •  acetaminophen (TYLENOL) tab 650 mg, 650 mg, Oral, Q6H PRN  •  docusate sodium (COLACE) cap 100 mg, 100 mg, Oral, BID  •  PEG 3350 (MIRALAX) powder packet 17 g, 17 g, Oral, Daily PRN  •  bisacodyl (DULCOLAX) re Results  Component Value Date   WBC 6.4 07/12/2018   HGB 9.1 07/12/2018   HCT 29.5 07/12/2018   .0 07/12/2018   CREATSERUM 10.10 07/12/2018   BUN 24 07/12/2018    07/12/2018   K 4.3 07/12/2018    07/12/2018   CO2 26.0 07/12/2018   GLU 10 Cardizem and has had stabilization in heart rate and blood pressure. Cardiology is to evaluate. #2.  End-stage renal disease-the patient was not dialyzed yesterday and we will dialyze him today.   He will again receive dialysis tomorrow to maintain his

## 2018-07-13 NOTE — PLAN OF CARE
Pt Ok to go home per cardiology and primary. Will follow up with Dr Fany Izquierdo in 2 weeks and have next dialysis tomorrow. All belongings taken with pt. Pt and daughter verbalize understanding of DC instructions. Pt wheeled off unit with transport.

## 2018-09-25 ENCOUNTER — HOSPITAL ENCOUNTER (EMERGENCY)
Facility: HOSPITAL | Age: 78
Discharge: HOME | End: 2018-09-25
Attending: EMERGENCY MEDICINE
Payer: MEDICARE

## 2018-09-25 ENCOUNTER — APPOINTMENT (OUTPATIENT)
Dept: ULTRASOUND IMAGING | Facility: HOSPITAL | Age: 78
End: 2018-09-25
Attending: EMERGENCY MEDICINE
Payer: MEDICARE

## 2018-09-25 VITALS
TEMPERATURE: 97 F | DIASTOLIC BLOOD PRESSURE: 49 MMHG | WEIGHT: 141.13 LBS | BODY MASS INDEX: 25.01 KG/M2 | HEART RATE: 64 BPM | RESPIRATION RATE: 16 BRPM | HEIGHT: 63 IN | SYSTOLIC BLOOD PRESSURE: 116 MMHG | OXYGEN SATURATION: 99 %

## 2018-09-25 DIAGNOSIS — L03.115 CELLULITIS OF RIGHT LOWER EXTREMITY: Primary | ICD-10-CM

## 2018-09-25 DIAGNOSIS — D64.9 ANEMIA, UNSPECIFIED TYPE: ICD-10-CM

## 2018-09-25 LAB
ANION GAP SERPL CALC-SCNC: 8 MMOL/L (ref 0–18)
BASOPHILS # BLD AUTO: 0.05 X10(3) UL (ref 0–0.1)
BASOPHILS NFR BLD AUTO: 1 %
BUN BLD-MCNC: 9 MG/DL (ref 8–20)
BUN/CREAT SERPL: 1.5 (ref 10–20)
CALCIUM BLD-MCNC: 7.9 MG/DL (ref 8.3–10.3)
CHLORIDE SERPL-SCNC: 95 MMOL/L (ref 101–111)
CO2 SERPL-SCNC: 35 MMOL/L (ref 22–32)
CREAT BLD-MCNC: 6.09 MG/DL (ref 0.7–1.3)
EOSINOPHIL # BLD AUTO: 0.19 X10(3) UL (ref 0–0.3)
EOSINOPHIL NFR BLD AUTO: 3.9 %
ERYTHROCYTE [DISTWIDTH] IN BLOOD BY AUTOMATED COUNT: 17.6 % (ref 11.5–16)
GLUCOSE BLD-MCNC: 189 MG/DL (ref 70–99)
HCT VFR BLD AUTO: 29 % (ref 37–53)
HGB BLD-MCNC: 8.6 G/DL (ref 13–17)
IMMATURE GRANULOCYTE COUNT: 0.02 X10(3) UL (ref 0–1)
IMMATURE GRANULOCYTE RATIO %: 0.4 %
LYMPHOCYTES # BLD AUTO: 1.9 X10(3) UL (ref 0.9–4)
LYMPHOCYTES NFR BLD AUTO: 38.5 %
MCH RBC QN AUTO: 23.3 PG (ref 27–33.2)
MCHC RBC AUTO-ENTMCNC: 29.7 G/DL (ref 31–37)
MCV RBC AUTO: 78.6 FL (ref 80–99)
MONOCYTES # BLD AUTO: 0.48 X10(3) UL (ref 0.1–1)
MONOCYTES NFR BLD AUTO: 9.7 %
NEUTROPHIL ABS PRELIM: 2.29 X10 (3) UL (ref 1.3–6.7)
NEUTROPHILS # BLD AUTO: 2.29 X10(3) UL (ref 1.3–6.7)
NEUTROPHILS NFR BLD AUTO: 46.5 %
OSMOLALITY SERPL CALC.SUM OF ELEC: 290 MOSM/KG (ref 275–295)
PLATELET # BLD AUTO: 306 10(3)UL (ref 150–450)
POTASSIUM SERPL-SCNC: 3.2 MMOL/L (ref 3.6–5.1)
RBC # BLD AUTO: 3.69 X10(6)UL (ref 3.8–5.8)
RED CELL DISTRIBUTION WIDTH-SD: 50.8 FL (ref 35.1–46.3)
SODIUM SERPL-SCNC: 138 MMOL/L (ref 136–144)
WBC # BLD AUTO: 4.9 X10(3) UL (ref 4–13)

## 2018-09-25 PROCEDURE — 99284 EMERGENCY DEPT VISIT MOD MDM: CPT

## 2018-09-25 PROCEDURE — 36415 COLL VENOUS BLD VENIPUNCTURE: CPT

## 2018-09-25 PROCEDURE — 85025 COMPLETE CBC W/AUTO DIFF WBC: CPT | Performed by: EMERGENCY MEDICINE

## 2018-09-25 PROCEDURE — 93971 EXTREMITY STUDY: CPT | Performed by: EMERGENCY MEDICINE

## 2018-09-25 PROCEDURE — 87040 BLOOD CULTURE FOR BACTERIA: CPT | Performed by: EMERGENCY MEDICINE

## 2018-09-25 PROCEDURE — 93926 LOWER EXTREMITY STUDY: CPT | Performed by: EMERGENCY MEDICINE

## 2018-09-25 PROCEDURE — 80048 BASIC METABOLIC PNL TOTAL CA: CPT | Performed by: EMERGENCY MEDICINE

## 2018-09-25 RX ORDER — CEPHALEXIN 500 MG/1
500 CAPSULE ORAL 4 TIMES DAILY
Qty: 28 CAPSULE | Refills: 0 | Status: SHIPPED | OUTPATIENT
Start: 2018-09-25 | End: 2019-01-01

## 2018-09-25 NOTE — ED PROVIDER NOTES
Patient Seen in: [facility] Emergency Department    History   Patient presents with:  Cellulitis (integumentary, infectious)    Stated Complaint: swelling, warmth and redness to right foot with sores/r/o cellulitis    HPI    21-year-old male with hi (Room air)       Current:/49   Pulse 64   Temp 97.1 °F (36.2 °C) (Temporal)   Resp 16   Ht 160 cm (5' 3\")   Wt 64 kg   SpO2 99%   BMI 24.99 kg/m²         Physical Exam    GENERAL: Patient is awake, alert, well-appearing, in no acute distress.   HEENT -----------         ------                     CBC W/ DIFFERENTIAL[[id number]]          Abnormal            Final result                 Please view results for these tests on the individual orders.    [address] pm    Follow-up:  Beatriz Crespo MD  [address] [address]  [phone]    Schedule an appointment as soon as possible for a visit in 2 days          Medications Prescribed:  Current Discharge Medication List    START Jer Solorznao

## 2018-11-20 ENCOUNTER — APPOINTMENT (OUTPATIENT)
Dept: GENERAL RADIOLOGY | Facility: HOSPITAL | Age: 78
End: 2018-11-20
Attending: EMERGENCY MEDICINE
Payer: MEDICARE

## 2018-11-20 ENCOUNTER — HOSPITAL ENCOUNTER (EMERGENCY)
Facility: HOSPITAL | Age: 78
Discharge: HOME | End: 2018-11-20
Attending: EMERGENCY MEDICINE
Payer: MEDICARE

## 2018-11-20 ENCOUNTER — APPOINTMENT (OUTPATIENT)
Dept: CT IMAGING | Facility: HOSPITAL | Age: 78
End: 2018-11-20
Attending: EMERGENCY MEDICINE
Payer: MEDICARE

## 2018-11-20 VITALS
OXYGEN SATURATION: 98 % | DIASTOLIC BLOOD PRESSURE: 60 MMHG | BODY MASS INDEX: 24.1 KG/M2 | SYSTOLIC BLOOD PRESSURE: 124 MMHG | WEIGHT: 136 LBS | TEMPERATURE: 98 F | RESPIRATION RATE: 16 BRPM | HEART RATE: 60 BPM | HEIGHT: 63 IN

## 2018-11-20 DIAGNOSIS — R19.7 DIARRHEA, UNSPECIFIED TYPE: Primary | ICD-10-CM

## 2018-11-20 PROCEDURE — 71045 X-RAY EXAM CHEST 1 VIEW: CPT | Performed by: EMERGENCY MEDICINE

## 2018-11-20 PROCEDURE — 99284 EMERGENCY DEPT VISIT MOD MDM: CPT

## 2018-11-20 PROCEDURE — 70450 CT HEAD/BRAIN W/O DYE: CPT | Performed by: EMERGENCY MEDICINE

## 2018-11-20 PROCEDURE — 85610 PROTHROMBIN TIME: CPT | Performed by: EMERGENCY MEDICINE

## 2018-11-20 PROCEDURE — 96361 HYDRATE IV INFUSION ADD-ON: CPT

## 2018-11-20 PROCEDURE — 96360 HYDRATION IV INFUSION INIT: CPT

## 2018-11-20 PROCEDURE — 99285 EMERGENCY DEPT VISIT HI MDM: CPT

## 2018-11-20 PROCEDURE — 85025 COMPLETE CBC W/AUTO DIFF WBC: CPT | Performed by: EMERGENCY MEDICINE

## 2018-11-20 PROCEDURE — 80053 COMPREHEN METABOLIC PANEL: CPT | Performed by: EMERGENCY MEDICINE

## 2018-11-20 RX ORDER — SODIUM CHLORIDE 9 MG/ML
1000 INJECTION, SOLUTION INTRAVENOUS ONCE
Status: COMPLETED | OUTPATIENT
Start: 2018-11-20 | End: 2018-11-20

## 2018-11-20 NOTE — ED INITIAL ASSESSMENT (HPI)
Started with diarrhea 2 days ago. denies blood in the stool. Denies fever. Pt was taking antibiotics 1 month ago for cellulitis on the right leg and was tested for c diff 1 month ago that was negative.  Denies n/v.

## 2018-11-20 NOTE — ED PROVIDER NOTES
Patient Seen in: [facility] Emergency Department    History   Patient presents with:  Nausea/Vomiting/Diarrhea (gastrointestinal)    Stated Complaint: diarrhea     HPI    Yung Sevilla is a pleasant 70-year-old male whose history is obtained per family who Current:/58   Pulse 57   Temp 97.7 °F (36.5 °C) (Temporal)   Resp 13   Ht 160 cm (5' 3\")   Wt 61.7 kg   SpO2 98%   BMI 24.10 kg/m²         Physical Exam    Awake and alert HEENT exam is normal lungs are clear cardiovascular exam shows regular CULTURE W/SHIGATOXIN    Narrative: The following orders were created for panel order STOOL CULTURE W/SHIGATOXIN.   Procedure                               Abnormality         Status                     ---------                               -----------

## 2018-12-11 PROBLEM — N17.9 ACUTE RENAL FAILURE (ARF) (HCC): Status: RESOLVED | Noted: 2018-07-11 | Resolved: 2018-12-11

## 2018-12-11 PROBLEM — N17.9 ACUTE KIDNEY INJURY (HCC): Status: RESOLVED | Noted: 2018-07-11 | Resolved: 2018-12-11

## 2019-01-01 ENCOUNTER — LAB ENCOUNTER (OUTPATIENT)
Dept: LAB | Age: 79
End: 2019-01-01
Attending: INTERNAL MEDICINE
Payer: MEDICARE

## 2019-01-01 ENCOUNTER — HOSPITAL ENCOUNTER (OUTPATIENT)
Dept: ULTRASOUND IMAGING | Facility: HOSPITAL | Age: 79
Discharge: HOME | End: 2019-01-01
Attending: INTERNAL MEDICINE
Payer: MEDICARE

## 2019-01-01 ENCOUNTER — APPOINTMENT (OUTPATIENT)
Dept: NUCLEAR MEDICINE | Facility: HOSPITAL | Age: 79
DRG: 377 | End: 2019-01-01
Attending: INTERNAL MEDICINE
Payer: MEDICARE

## 2019-01-01 ENCOUNTER — HOSPITAL ENCOUNTER (INPATIENT)
Facility: HOSPITAL | Age: 79
LOS: 4 days | Discharge: ANOTHER HOSPITAL | DRG: 377 | End: 2019-01-01
Attending: EMERGENCY MEDICINE | Admitting: INTERNAL MEDICINE
Payer: MEDICARE

## 2019-01-01 VITALS
TEMPERATURE: 98 F | BODY MASS INDEX: 23.15 KG/M2 | RESPIRATION RATE: 16 BRPM | HEART RATE: 59 BPM | HEIGHT: 64.02 IN | OXYGEN SATURATION: 100 % | WEIGHT: 135.63 LBS | SYSTOLIC BLOOD PRESSURE: 115 MMHG | DIASTOLIC BLOOD PRESSURE: 41 MMHG

## 2019-01-01 DIAGNOSIS — N50.89 SCROTAL SWELLING: ICD-10-CM

## 2019-01-01 DIAGNOSIS — D62 ACUTE BLOOD LOSS ANEMIA: ICD-10-CM

## 2019-01-01 DIAGNOSIS — Z00.00 EXAMINATION: Primary | ICD-10-CM

## 2019-01-01 DIAGNOSIS — K92.1 MELENA: Primary | ICD-10-CM

## 2019-01-01 PROCEDURE — 0DBH8ZX EXCISION OF CECUM, VIA NATURAL OR ARTIFICIAL OPENING ENDOSCOPIC, DIAGNOSTIC: ICD-10-PCS | Performed by: INTERNAL MEDICINE

## 2019-01-01 PROCEDURE — 36415 COLL VENOUS BLD VENIPUNCTURE: CPT | Performed by: OTHER

## 2019-01-01 PROCEDURE — 90935 HEMODIALYSIS ONE EVALUATION: CPT | Performed by: INTERNAL MEDICINE

## 2019-01-01 PROCEDURE — 78299 UNLISTED GI PX DX NUC MED: CPT | Performed by: INTERNAL MEDICINE

## 2019-01-01 PROCEDURE — 30233N1 TRANSFUSION OF NONAUTOLOGOUS RED BLOOD CELLS INTO PERIPHERAL VEIN, PERCUTANEOUS APPROACH: ICD-10-PCS | Performed by: INTERNAL MEDICINE

## 2019-01-01 PROCEDURE — 99232 SBSQ HOSP IP/OBS MODERATE 35: CPT | Performed by: INTERNAL MEDICINE

## 2019-01-01 PROCEDURE — 80177 DRUG SCRN QUAN LEVETIRACETAM: CPT | Performed by: OTHER

## 2019-01-01 PROCEDURE — 93975 VASCULAR STUDY: CPT | Performed by: INTERNAL MEDICINE

## 2019-01-01 PROCEDURE — 85018 HEMOGLOBIN: CPT

## 2019-01-01 PROCEDURE — 78278 ACUTE GI BLOOD LOSS IMAGING: CPT | Performed by: INTERNAL MEDICINE

## 2019-01-01 PROCEDURE — 5A1D70Z PERFORMANCE OF URINARY FILTRATION, INTERMITTENT, LESS THAN 6 HOURS PER DAY: ICD-10-PCS | Performed by: INTERNAL MEDICINE

## 2019-01-01 PROCEDURE — 0DJ08ZZ INSPECTION OF UPPER INTESTINAL TRACT, VIA NATURAL OR ARTIFICIAL OPENING ENDOSCOPIC: ICD-10-PCS | Performed by: INTERNAL MEDICINE

## 2019-01-01 PROCEDURE — 0W3P8ZZ CONTROL BLEEDING IN GASTROINTESTINAL TRACT, VIA NATURAL OR ARTIFICIAL OPENING ENDOSCOPIC: ICD-10-PCS | Performed by: INTERNAL MEDICINE

## 2019-01-01 PROCEDURE — 76870 US EXAM SCROTUM: CPT | Performed by: INTERNAL MEDICINE

## 2019-01-01 RX ORDER — LEVETIRACETAM 500 MG/1
500 TABLET ORAL 2 TIMES DAILY
Status: DISCONTINUED | OUTPATIENT
Start: 2019-01-01 | End: 2019-01-01

## 2019-01-01 RX ORDER — DEXTROSE MONOHYDRATE 25 G/50ML
50 INJECTION, SOLUTION INTRAVENOUS
Status: DISCONTINUED | OUTPATIENT
Start: 2019-01-01 | End: 2019-01-01 | Stop reason: HOSPADM

## 2019-01-01 RX ORDER — SODIUM CHLORIDE 9 MG/ML
INJECTION, SOLUTION INTRAVENOUS ONCE
Status: COMPLETED | OUTPATIENT
Start: 2019-01-01 | End: 2019-01-01

## 2019-01-01 RX ORDER — WARFARIN SODIUM 1 MG/1
1 TABLET ORAL SEE ADMIN INSTRUCTIONS
Status: ON HOLD | COMMUNITY
End: 2019-01-01

## 2019-01-01 RX ORDER — PANTOPRAZOLE SODIUM 40 MG/1
40 TABLET, DELAYED RELEASE ORAL
Status: DISCONTINUED | OUTPATIENT
Start: 2019-01-01 | End: 2019-01-01

## 2019-01-01 RX ORDER — RIVASTIGMINE 9.5 MG/24H
1 PATCH, EXTENDED RELEASE TRANSDERMAL DAILY
COMMUNITY

## 2019-01-01 RX ORDER — ALBUMIN (HUMAN) 12.5 G/50ML
SOLUTION INTRAVENOUS
Status: COMPLETED
Start: 2019-01-01 | End: 2019-01-01

## 2019-01-01 RX ORDER — AMIODARONE HYDROCHLORIDE 200 MG/1
200 TABLET ORAL NIGHTLY
Status: DISCONTINUED | OUTPATIENT
Start: 2019-01-01 | End: 2019-01-01

## 2019-01-01 RX ORDER — LEVETIRACETAM 250 MG/1
500 TABLET ORAL 2 TIMES DAILY
COMMUNITY
End: 2019-01-01

## 2019-01-01 RX ORDER — LORAZEPAM 0.5 MG/1
0.5 TABLET ORAL DAILY PRN
Status: DISCONTINUED | OUTPATIENT
Start: 2019-01-01 | End: 2019-01-01

## 2019-01-01 RX ORDER — METOPROLOL TARTRATE 50 MG/1
50 TABLET, FILM COATED ORAL
Status: DISCONTINUED | OUTPATIENT
Start: 2019-01-01 | End: 2019-01-01

## 2019-01-01 RX ORDER — SODIUM CHLORIDE, SODIUM LACTATE, POTASSIUM CHLORIDE, CALCIUM CHLORIDE 600; 310; 30; 20 MG/100ML; MG/100ML; MG/100ML; MG/100ML
INJECTION, SOLUTION INTRAVENOUS CONTINUOUS
Status: DISCONTINUED | OUTPATIENT
Start: 2019-01-01 | End: 2019-01-01

## 2019-01-01 RX ORDER — AMIODARONE HYDROCHLORIDE 200 MG/1
200 TABLET ORAL DAILY
Status: DISCONTINUED | OUTPATIENT
Start: 2019-01-01 | End: 2019-01-01

## 2019-01-01 RX ORDER — MAGNESIUM CARB/ALUMINUM HYDROX 105-160MG
296 TABLET,CHEWABLE ORAL ONCE
Status: COMPLETED | OUTPATIENT
Start: 2019-01-01 | End: 2019-01-01

## 2019-01-01 RX ORDER — ACETAMINOPHEN 500 MG
500 TABLET ORAL EVERY 6 HOURS PRN
COMMUNITY

## 2019-01-01 RX ORDER — NALOXONE HYDROCHLORIDE 0.4 MG/ML
80 INJECTION, SOLUTION INTRAMUSCULAR; INTRAVENOUS; SUBCUTANEOUS AS NEEDED
Status: DISCONTINUED | OUTPATIENT
Start: 2019-01-01 | End: 2019-01-01 | Stop reason: HOSPADM

## 2019-01-01 RX ORDER — WARFARIN SODIUM 2.5 MG/1
1.25 TABLET ORAL SEE ADMIN INSTRUCTIONS
Status: ON HOLD | COMMUNITY
End: 2019-01-01

## 2019-01-01 RX ORDER — ASPIRIN 81 MG/1
81 TABLET ORAL DAILY
Status: ON HOLD | COMMUNITY
End: 2019-01-01

## 2019-01-01 RX ORDER — PANTOPRAZOLE SODIUM 40 MG/1
40 TABLET, DELAYED RELEASE ORAL
Refills: 0 | Status: SHIPPED | COMMUNITY
Start: 2019-01-01 | End: 2019-01-01 | Stop reason: ALTCHOICE

## 2019-01-01 RX ORDER — ACETAMINOPHEN 325 MG/1
650 TABLET ORAL ONCE
Status: COMPLETED | OUTPATIENT
Start: 2019-01-01 | End: 2019-01-01

## 2019-01-01 RX ORDER — OMEPRAZOLE 20 MG/1
20 CAPSULE, DELAYED RELEASE ORAL
Status: ON HOLD | COMMUNITY
End: 2019-01-01

## 2019-01-01 RX ORDER — LORAZEPAM 0.5 MG/1
0.5 TABLET ORAL EVERY 6 HOURS PRN
Status: DISCONTINUED | OUTPATIENT
Start: 2019-01-01 | End: 2019-01-01

## 2019-01-01 RX ORDER — ACETAMINOPHEN 325 MG/1
650 TABLET ORAL EVERY 6 HOURS PRN
Status: DISCONTINUED | OUTPATIENT
Start: 2019-01-01 | End: 2019-01-01

## 2019-01-01 RX ORDER — RIVASTIGMINE 9.5 MG/24H
1 PATCH, EXTENDED RELEASE TRANSDERMAL DAILY
Status: DISCONTINUED | OUTPATIENT
Start: 2019-01-01 | End: 2019-01-01

## 2019-01-05 PROCEDURE — 87177 OVA AND PARASITES SMEARS: CPT | Performed by: INTERNAL MEDICINE

## 2019-01-05 PROCEDURE — 87272 CRYPTOSPORIDIUM AG IF: CPT | Performed by: INTERNAL MEDICINE

## 2019-01-05 PROCEDURE — 87329 GIARDIA AG IA: CPT | Performed by: INTERNAL MEDICINE

## 2019-01-05 PROCEDURE — 87209 SMEAR COMPLEX STAIN: CPT | Performed by: INTERNAL MEDICINE

## 2019-01-07 PROCEDURE — 87045 FECES CULTURE AEROBIC BACT: CPT | Performed by: INTERNAL MEDICINE

## 2019-01-07 PROCEDURE — 87077 CULTURE AEROBIC IDENTIFY: CPT | Performed by: INTERNAL MEDICINE

## 2019-01-07 PROCEDURE — 87046 STOOL CULTR AEROBIC BACT EA: CPT | Performed by: INTERNAL MEDICINE

## 2019-01-07 PROCEDURE — 87427 SHIGA-LIKE TOXIN AG IA: CPT | Performed by: INTERNAL MEDICINE

## 2019-01-19 ENCOUNTER — HOSPITAL ENCOUNTER (EMERGENCY)
Facility: HOSPITAL | Age: 79
Discharge: HOME | End: 2019-01-19
Attending: EMERGENCY MEDICINE
Payer: MEDICARE

## 2019-01-19 ENCOUNTER — APPOINTMENT (OUTPATIENT)
Dept: GENERAL RADIOLOGY | Facility: HOSPITAL | Age: 79
End: 2019-01-19
Attending: EMERGENCY MEDICINE
Payer: MEDICARE

## 2019-01-19 ENCOUNTER — APPOINTMENT (OUTPATIENT)
Dept: CT IMAGING | Facility: HOSPITAL | Age: 79
End: 2019-01-19
Attending: EMERGENCY MEDICINE
Payer: MEDICARE

## 2019-01-19 VITALS
RESPIRATION RATE: 24 BRPM | SYSTOLIC BLOOD PRESSURE: 128 MMHG | DIASTOLIC BLOOD PRESSURE: 51 MMHG | WEIGHT: 149.94 LBS | OXYGEN SATURATION: 100 % | HEART RATE: 59 BPM | BODY MASS INDEX: 27 KG/M2 | TEMPERATURE: 98 F

## 2019-01-19 DIAGNOSIS — M79.602 LEFT ARM PAIN: Primary | ICD-10-CM

## 2019-01-19 PROCEDURE — 73060 X-RAY EXAM OF HUMERUS: CPT | Performed by: EMERGENCY MEDICINE

## 2019-01-19 PROCEDURE — 99285 EMERGENCY DEPT VISIT HI MDM: CPT | Performed by: EMERGENCY MEDICINE

## 2019-01-19 PROCEDURE — 99284 EMERGENCY DEPT VISIT MOD MDM: CPT | Performed by: EMERGENCY MEDICINE

## 2019-01-19 PROCEDURE — 73090 X-RAY EXAM OF FOREARM: CPT | Performed by: EMERGENCY MEDICINE

## 2019-01-19 PROCEDURE — 70450 CT HEAD/BRAIN W/O DYE: CPT | Performed by: EMERGENCY MEDICINE

## 2019-01-20 NOTE — ED PROVIDER NOTES
Patient Seen in: [facility] Emergency Department    History   Patient presents with:  Upper Extremity Injury (musculoskeletal)    Stated Complaint: Fall on 1/18, Lt elbow pain.  On Coumadin    HPI    Patient is a pleasant 61-year-old male, with multi (Room air)       Current:/51   Pulse 59   Temp 97.6 °F (36.4 °C) (Temporal)   Resp 24   Wt 68 kg   SpO2 100%   BMI 26.56 kg/m²       Physical Exam   Constitutional: He appears well-developed and well-nourished. No distress.    HENT:   Head: Normocepha EFFUSION:   None visible. OTHER:    Vascular stent noted within the left arm. CONCLUSION:  No acute bony injury to the left humerus.     Dictated by: Milla Maria MD on 1/19/2019 at 18:22     Approved by: Milla Maria MD            Ct Brain Or Head ( arm were obtained. CT scan of the head was obtained based on the history of fall while on Coumadin. Family states that the patient is behaving normally for himself. Results of the x-rays were reviewed and discussed with the patient/family.   Patient wi

## 2019-04-30 PROBLEM — K92.1 MELENA: Status: ACTIVE | Noted: 2019-01-01

## 2019-04-30 PROBLEM — D62 ACUTE BLOOD LOSS ANEMIA: Status: ACTIVE | Noted: 2019-01-01

## 2019-04-30 NOTE — PROGRESS NOTES
04/30/19 1718   Clinical Encounter Type   Visited With Patient and family together  (daughter and brother at bedside)   Routine Visit Introduction   Continue Visiting No   Patient's Supportive Strategies/Resources  provided emotional support, in

## 2019-04-30 NOTE — H&P
[facility] Hospitalist H&P       CC: Patient presents with:  GI Bleeding (gastrointestinal)       PCP: Carmen Santana MD    History of Present Illness:  Pt is a 70year old male with hx ESRD on HD, HTN, HLD, SVT, prior CVA, dementia who presents after an ep Disp:  Rfl:    Warfarin Sodium 2.5 MG Oral Tab Take 1.25 mg by mouth See Admin Instructions. Three days a week Mondays, Wednesdays and Fridays Disp:  Rfl:    rivastigmine 9.5 MG/24HR Transdermal Patch 24 Hr Place 1 patch onto the skin daily.  Disp:  Rfl: effort  CV: RRR, nL S1/S2, no m/r/g  Abd: soft, NT/ND, BS+  MSK: moving all extremities, no LE edema  Neuro: CN II-XII grossly intact, no focal deficits  Skin: no rashes/lesions  Psych: cooperative, normal mood/affect          Diagnostic Data:    CBC/Chem patient will require TBD.

## 2019-04-30 NOTE — ED INITIAL ASSESSMENT (HPI)
Black tarry stools yesterday. No nausea or vomiting. Sent to ED by Laya Harris. Hx dialysis, early onset dementia. Last dialysis was last night.

## 2019-04-30 NOTE — PLAN OF CARE
Patient received via stretcher from ER with melena accompanied by daughter, patient alert and oriented, history of dementia. Lungs clear on room air, no cough or SOB noted.  Abdomen soft, bowel sounds present, passing flatus, BM this am. Patient no longer v

## 2019-04-30 NOTE — ED PROVIDER NOTES
Patient Seen in: [facility] Emergency Department    History   Patient presents with:  GI Bleeding (gastrointestinal)    Stated Complaint: GI bleed    HPI    Patient is a 51-year-old male brought in by family, medically as noted below, for  having ha Neck: Normal range of motion. Neck supple. No JVD present. Cardiovascular: Normal rate and regular rhythm. Pulmonary/Chest: Effort normal and breath sounds normal. No stridor. Abdominal: Soft. There is no tenderness. There is no guarding.    Muscul TYPE)   ANTIBODY SCREEN   RAINBOW DRAW BLUE   RAINBOW DRAW LAVENDER   RAINBOW DRAW LIGHT GREEN   RAINBOW DRAW GOLD          Hemoglobin today 7.1. Family provided blood work from dialysis, patient's hemoglobin last week was 9.5. Earlier this month was 6.

## 2019-04-30 NOTE — CONSULTS
9333  152[facility]  Patient Status:  Inpatient   Date of Birth 10/18/1940 MRN [id number]   [facility] 4NW-A Attending Isabella Bills MD   Hosp Day # 0 PCP Gissel Lu MD     Da Reported    Medication omeprazole 20 MG Oral Capsule Delayed Release, Sig Take 20 mg by mouth every morning before breakfast., Start Date , End Date , Taking?  Yes, Authorizing Provider External/Patient, Reported    Medication levETIRAcetam 250 MG Oral Tab, External/Patient, Reported    Medication levETIRAcetam 250 MG Oral Tab, Sig TAKE 2 TABLETS TWICE A DAY, Start Date 12/11/18, End Date 4/30/19, Taking? , Authorizing Provider Harshil Brenner MD    Medication ASPIRIN 81 MG OR TABS, Sig 1 TABLET DAILY, K 4.0 04/30/2019     04/30/2019    CO2 33.0 04/30/2019     04/30/2019    CA 8.4 04/30/2019    ALB 2.8 04/30/2019    ALKPHO 95 04/30/2019    BILT 0.6 04/30/2019    TP 7.2 04/30/2019    AST 26 04/30/2019    ALT 24 04/30/2019    INR 2.12 04/30

## 2019-04-30 NOTE — CONSULTS
[facility]  Report of Consultation    Álvaro Gonzalez.  Patient Status:  Inpatient    10/18/1940 MRN [id number]   [place] 4NW-A Attending Osiris Ford MD   Hosp Day # 0 PCP Fer Blackwell MD     [facility] SERVICES, [facility] ([facility]) mg, Oral, 2x Daily(Beta Blocker)  •  [START ON 5/1/2019] rivastigmine (EXELON) 9.5 MG/24HR patch 1 patch, 1 patch, Transdermal, Daily  •  [START ON 5/1/2019] Pantoprazole Sodium (PROTONIX) EC tab 40 mg, 40 mg, Oral, QAM AC    No current outpatient medicati Glucose (mg/dL)   Date Value   08/14/2013 87     BUN (mg/dL)   Date Value   04/30/2019 37 (H)   11/20/2018 10   09/25/2018 9   03/15/2011 60 (H)   03/14/2011 89 (H)   03/13/2011 63 (H)     Blood Urea Nitrogen (mg/dL)   Date Value   10/06/2018 10   01

## 2019-05-01 NOTE — OPERATIVE REPORT
[facility]  Esophagogastroduodenoscopy Report    Nessa Paula.  Patient Status:  Inpatient    10/18/1940 MRN [id number]   [facility] ENDOSCOPY Attending Norma Fuentes MD      DATE OF OPERATION: 2019     PREOPERA

## 2019-05-01 NOTE — DIETARY NOTE
[facility] Admitting diagnosis:  Melena [K92.1]  Acute blood loss anemia [D62]    Ht: 162.6 cm (5' 4.02\")  Wt: 62.1 kg (137 lb). Body mass index is 23.5 kg/m².   Wt Readings from Last 10 Encounters

## 2019-05-01 NOTE — PLAN OF CARE
Problem: Patient/Family Goals  Goal: Patient/Family Long Term Goal  Description  Patient's Long Term Goal:   Interventions:  - See additional Care Plan goals for specific interventions  Outcome: Progressing  Goal: Patient/Family Short Term Goal  Descript appropriate  Outcome: Progressing     Problem: HEMATOLOGIC - ADULT  Goal: Maintains hematologic stability  Description  INTERVENTIONS  - Assess for signs and symptoms of bleeding or hemorrhage  - Monitor labs and vital signs for trends  - Administer suppor

## 2019-05-01 NOTE — CM/SW NOTE
05/01/19 0900   CM/SW Screening   Referral Source Physician   Information Source Chart review;Nursing rounds   Patient's Mental Status Alert;Oriented;Memory Impairments   Patient lives with Children   Patient Status Prior to Admission   Services in plac

## 2019-05-01 NOTE — OCCUPATIONAL THERAPY NOTE
Attempted to see pt for skilled OT services this date. Pt is currently receiving HD then is scheduled for an EGD this date. Will re-attempt as schedule allows.  Michelle Pearson, 05/01/19, 9:57 AM

## 2019-05-01 NOTE — PLAN OF CARE
Problem: GASTROINTESTINAL - ADULT  Goal: Maintains or returns to baseline bowel function  Description  INTERVENTIONS:  - Assess bowel function  - Maintain adequate hydration with IV or PO as ordered and tolerated  - Evaluate effectiveness of GI medicatio no fresh blood noted.

## 2019-05-01 NOTE — PHYSICAL THERAPY NOTE
Pt attempted for PT this AM.  Pt with dialysis, to have EGD after. Will f/u later during admit as appropriate.

## 2019-05-01 NOTE — PROGRESS NOTES
[facility]  Nephrology Progress Note    Jinny Lam.  Patient Status:  Inpatient    10/18/1940 MRN [id number]   [facility] 4NW-A Attending Tiffany Glass MD   Hosp Day # 1 PCP Regina Johnson MD       SUBJECTIVE:  Pt PRN   levETIRAcetam (KEPPRA) tab 500 mg 500 mg Oral BID   Metoprolol Tartrate (LOPRESSOR) tab 50 mg 50 mg Oral 2x Daily(Beta Blocker)   rivastigmine (EXELON) 9.5 MG/24HR patch 1 patch 1 patch Transdermal Daily   Pantoprazole Sodium (PROTONIX) EC tab 40 mg

## 2019-05-01 NOTE — PROGRESS NOTES
[facility] Hospitalist Progress Note     Pepe Leonard.  Patient Status:  Inpatient    10/18/1940 MRN [id number]   [facility] 4NW-A Attending Miles Yanez MD   Hosp Day # 1 PCP Blayne Zuniga MD     CC: follow up    [address] [facility] Nightly     Continuous Infusing Medication:  PRN Medication:LORazepam        Assessment/Plan:     70year old male with hx ESRD on HD, HTN, HLD, SVT, prior CVA, dementia who presents after an episode of melena and anemia.     # GI bleed/melena  # Anemia  -

## 2019-05-02 NOTE — PHYSICAL THERAPY NOTE
Attempted to see pt today for PT eval and pt off floor for a colonoscopy. Will attempt again as schedule allows. Thank you.

## 2019-05-02 NOTE — PLAN OF CARE
Problem: Patient/Family Goals  Goal: Patient/Family Long Term Goal  Description  Patient's Long Term Goal:  Interventions:    - See additional Care Plan goals for specific interventions  Outcome: Progressing  Goal: Patient/Family Short Term Goal  Descrip appropriate  Outcome: Progressing     Problem: HEMATOLOGIC - ADULT  Goal: Maintains hematologic stability  Description  INTERVENTIONS  - Assess for signs and symptoms of bleeding or hemorrhage  - Monitor labs and vital signs for trends  - Administer suppor

## 2019-05-02 NOTE — OCCUPATIONAL THERAPY NOTE
Attempted to see pt for skilled OT services this date. Pt is currently off the floor for a colonoscopy. Will re-attempt as schedule allows.  Sulema Alaniz, 05/02/19, 3:18 PM

## 2019-05-02 NOTE — OPERATIVE REPORT
[facility]  Colonoscopy Report      Dewanda Maximino. Patient Status:  Inpatient    10/18/1940 MRN [id number]   [facility] ENDOSCOPY Attending Yg Denise MD       DATE OF OPERATION: 2019     PREOPERATIVE DIAGNOSIS:     1. 3. If further bleeding occurs, consider capsule endoscopy or CT enterography.

## 2019-05-02 NOTE — PLAN OF CARE
A&O times 2-3, family at bedside. Denies any pain. Denies any SOB. Lung sounds clear. 2 large, dark tarry stools after one bottle of mag citrate this shift. Next bottle due at 0600. Up with assist to [facility]. Seizure precautions in place.   No seizure ac straws  - Encourage small bites of food and small sips of liquid  - Offer pills one at a time, crush or deliver with applesauce as needed  - Discontinue feeding and notify MD (or speech pathologist) if coughing or persistent throat clearing or wet/gurgly v

## 2019-05-02 NOTE — PROGRESS NOTES
[facility] Hospitalist Progress Note     Naga Coy.  Patient Status:  Inpatient    10/18/1940 MRN [id number]   [facility] 4NW-A Attending Ana Delatorre MD   Hosp Day # 2 PCP Cathy Acevedo MD     CC: follow up    [address] [facility] Blocker)   • rivastigmine  1 patch Transdermal Daily   • Pantoprazole Sodium  40 mg Oral QAM AC   • amiodarone HCl  200 mg Oral Nightly     Continuous Infusing Medication:  PRN Medication:LORazepam        Assessment/Plan:     70year old male with hx ESRD

## 2019-05-03 NOTE — OCCUPATIONAL THERAPY NOTE
OT attempted to see the pt for an eval, however per RN Delmis, pt is currently being transported down for a bleeding scan for the next several hours. OT will re-attempt if time allows and pt is medically appropriate.       Thank you for allowing me to care f

## 2019-05-03 NOTE — PROGRESS NOTES
[facility] Hospitalist Progress Note     Agata Ask.  Patient Status:  Inpatient    10/18/1940 MRN [id number]   [facility] 4NW-A Attending Raheel Taylor MD   Hosp Day # 3 PCP Carmen Santana MD     CC: follow up    [address]0 [facility] --        No results for input(s): PGLU in the last 168 hours.     Imaging:  Nm Gi Blood Loss Study With Spect/ct (YVW=06919/89961)    Result Date: 5/3/2019  CONCLUSION:  Positive for GI bleed involving a loop of small bowel left mid abdomen, probably jeju takes ativan 3-4x per day  - ordered ativan . 5mg PO q6hr PRN     SCDs (no anticoag currently given bleeding/anemia issues)          Addendum:   Discussed with pt's daughter over the phone earlier this evening.  Pt was likely prescribed coumadin by Dr. Karly Pearl

## 2019-05-03 NOTE — DIETARY NOTE
[address] [facility]. Admitting diagnosis:  Melena [K92.1]  Acute blood loss anemia [D62]    Ht: 162.6 cm (5' 4.02\")  Wt: 61.5 kg (135 lb 9.6 oz). Body mass index is 23.26 kg/m².   Wt Readings from Last 10 En

## 2019-05-03 NOTE — PROGRESS NOTES
[facility]  Nephrology Progress Note    Yayokaveh Anderson.  Patient Status:  Inpatient    10/18/1940 MRN [id number]   [facility] 4NW-A Attending Toni Norton MD   Hosp Day # 3 PCP Valentino Coons, MD       SUBJECTIVE:  Pt 37*  --  53* 49* 79*   CREATSERUM 4.87*  --  5.86* 4.89* 6.46*   CA 8.4*  --  8.1* 8.3* 8.4*   *  --  111* 111* 115*       Recent Labs   Lab 04/30/19  1018 04/30/19  1100   ALT 24  --    AST  --  26   ALB 2.8*  --        No results for input(s): PGL

## 2019-05-03 NOTE — PROGRESS NOTES
Bleeding scan shows bleeding in mid-jejunum. I spoke with radiology and the amount of bleeding is small and not enough to be seen on angiography. He has not had further melena. Will observe patient.  If significant bleeding occurs, would plan angiograph

## 2019-05-03 NOTE — PROGRESS NOTES
[facility]  GI Progress Note      Macey Larose.  Patient Status:  Inpatient    10/18/1940 MRN [id number]   [facility] 4NW-A Attending Ben Mendenhall MD   Hosp Day # 3 PCP Daija Vargas MD          SUBJECTIVE:     No melena

## 2019-05-03 NOTE — PROGRESS NOTES
No events overnight. Vitals stable, afebrile. Denies any pain. Bowel sounds active. Poor appetite at dinner. Plan for HD tomorrow morning, and possible D/C. Daughter at bedside. All needs met, will monitor.

## 2019-05-03 NOTE — PHYSICAL THERAPY NOTE
PT attempted to see the pt for an eval, however per GALLITO Benites, pt is currently being transported down for a bleeding scan for the next several hours. PT will re-attempt if time allows and pt is medically appropriate.

## 2019-05-04 NOTE — PHYSICAL THERAPY NOTE
PHYSICAL THERAPY EVALUATION - INPATIENT     Room Number: 408/408-A  Evaluation Date: 5/4/2019  Type of Evaluation: Initial  Physician Order: PT Eval and Treat    Presenting Problem: GI bleed, mid jejunum  Reason for Therapy: Mobility Dysfunction and Eduardo Xie Performed by Jose Hernández MD at [facility] ENDOSCOPY   • OTHER SURGICAL HISTORY      placement of fistula for dialysis       HOME SITUATION  Type of Home: House   Home Layout: Two level;Ramped entrance  Stairs to Enter : 0     Stairs to Bedroom: 14(chair lift o flexion  3-/5  Right Knee extension  3-/5  Left Knee extension  3-/5  Right Dorsiflexion  3/5  Left Dorsiflexion  3-/5    BALANCE  Static Sitting: Not tested  Dynamic Sitting: Not tested  Static Standing: Not tested  Dynamic Standing: Not tested    ADDITIO recommendation, assessment findings, goals and expectations. Functional activity tolerated  ROM    Patient End of Session: In bed;Needs met;Call light within reach;RN aware of session/findings; All patient questions and concerns addressed; Family present Comments: Goals established on 5/4/2019

## 2019-05-04 NOTE — DISCHARGE SUMMARY
General Medicine Discharge Summary     Patient ID:  Barb Cheema  70year old  10/18/1940    Admit date: 4/30/2019    Discharge date and time: 5/4/19    Attending Physician: Coretta Kim MD     Primary Care Physician: Brionna Hameed MD of anticoagulation.      # Dementia  - no acute issues      Consults: IP CONSULT TO HOSPITALIST  IP CONSULT TO GASTROENTEROLOGY  IP CONSULT TO SPIRITUAL CARE  NURSING CONSULT TO DIETITIAN  IP CONSULT TO SOCIAL WORK  IP CONSULT TO NEPHROLOGY  IP CONSULT TO

## 2019-05-04 NOTE — PROGRESS NOTES
[facility]  Nephrology Progress Note    Pepe Leonard.  Patient Status:  Inpatient    10/18/1940 MRN [id number]   [facility] 4NW-A Attending Miles Yanez MD   [facility] Day # 4 PCP Blayne Zuniga MD       SUBJECTIVE:  No BUN 49* 79* 41*   CREATSERUM 4.89* 6.46* 4.36*   CA 8.3* 8.4* 8.5       No results for input(s): ALT, AST, ALB, AMYLASE, LIPASE, LDH in the last 72 hours.     Invalid input(s): ALPHOS, TBIL, DBIL, TPROT        Impression/Plan:    1  Anemia/melena- has chr

## 2019-05-04 NOTE — PLAN OF CARE
Problem: Diabetes/Glucose Control  Goal: Glucose maintained within prescribed range  Description  INTERVENTIONS:  - Monitor Blood Glucose as ordered  - Assess for signs and symptoms of hyperglycemia and hypoglycemia  - Administer ordered medications to m appropriate  Outcome: Progressing     Problem: NEUROLOGICAL - ADULT  Goal: Absence of seizures  Description  INTERVENTIONS  - Monitor for seizure activity  - Administer anti-seizure medications as ordered  - Monitor neurological status  Outcome: Progressin

## 2019-05-04 NOTE — PROGRESS NOTES
[facility] Hospitalist Progress Note     [facility]      SUBJECTIVE:  Denies SOB or CP  No abdominal pain  No further bleeding    OBJECTIVE:  Temp:  [98.2 °F (36.8 °C)-98.7 °F (37.1 °C)] 98.2 °F (36.8 °C)  Pulse:  [59-63] 59  Resp:  [16-18] 18  BP: (102-12 5/3/2019  PROCEDURE:  NM GI BLOOD LOSS STUDY WITH SPECT/CT (CPT=78278/94558)  COMPARISON:  None.   INDICATION:   TECHNIQUE:  20.1 mCi Tc99m autologously labelled RBC's were re-injected intravenously, and dynamic images of the abdomen were obtained in the an Facility-Administered Medications:  acetaminophen (TYLENOL) tab 650 mg 650 mg Oral Q6H PRN   0.9%  NaCl infusion  Intravenous Once   acetaminophen (TYLENOL) tab 650 mg 650 mg Oral Once   0.9%  NaCl infusion  Intravenous Once   lactated ringers infusion  In hold more indefinitely.  Have not yet discussed with family who may be able to provide more info- will ask my colleague who will see patient tomorrow to follow-up on this as well.       # Dementia  - no acute issues     # Anxiety  - per daughter pt takes at

## 2019-05-04 NOTE — OCCUPATIONAL THERAPY NOTE
Received order for OT Eval and txt. Review pt chart and spoke with RN and PT who attempted eval in am.  Pt with current hgb: 7.2 and lethargy severely limiting participation in therapy at this time. Will re-attempt tomorrow.

## 2019-05-04 NOTE — PROGRESS NOTES
[facility]  GI Progress Note      Álvaro Gonzalez.  Patient Status:  Inpatient    10/18/1940 MRN [id number]   [facility] 4NW-A Attending Karyn Ibarra MD   Hosp Day # 4 PCP Fer Blackwell MD          SUBJECTIVE:     No melena Bilateral pleural effusion partially seen larger on the right, with bilateral lower lobe atelectasis worse on the right. Hyperdense material within nondistended gallbladder. Polycystic kidney changes are seen.   Partial visualization of right inguina

## 2019-05-04 NOTE — PROGRESS NOTES
NURSING DISCHARGE NOTE    Discharged Another hospital via Ambulance.   Accompanied by Family member  Belongings returned to pt/family  Pt aaox2-3, denies pain, completed 1 unit prbc and tolerated well via rt jugular, pt transferred to [facility] for possibl

## 2019-10-06 NOTE — PROGRESS NOTES
Telephone Information:  Mobile          [phone]  Daughter Uriel Carter advised of US results  Informed scrotal mass is large hernia  Dr Tanvir Magana recommending surgical consult to further evaluate  Verbalized understanding

## 2020-01-01 ENCOUNTER — HOSPITAL ENCOUNTER (INPATIENT)
Facility: HOSPITAL | Age: 80
LOS: 5 days | Discharge: HOSPICE | DRG: 871 | End: 2020-01-01
Attending: EMERGENCY MEDICINE | Admitting: INTERNAL MEDICINE
Payer: MEDICARE

## 2020-01-01 ENCOUNTER — APPOINTMENT (OUTPATIENT)
Dept: GENERAL RADIOLOGY | Facility: HOSPITAL | Age: 80
DRG: 252 | End: 2020-01-01
Attending: EMERGENCY MEDICINE
Payer: MEDICARE

## 2020-01-01 ENCOUNTER — APPOINTMENT (OUTPATIENT)
Dept: MRI IMAGING | Facility: HOSPITAL | Age: 80
DRG: 252 | End: 2020-01-01
Attending: Other
Payer: MEDICARE

## 2020-01-01 ENCOUNTER — APPOINTMENT (OUTPATIENT)
Dept: CT IMAGING | Facility: HOSPITAL | Age: 80
DRG: 252 | End: 2020-01-01
Attending: EMERGENCY MEDICINE
Payer: MEDICARE

## 2020-01-01 ENCOUNTER — APPOINTMENT (OUTPATIENT)
Dept: GENERAL RADIOLOGY | Facility: HOSPITAL | Age: 80
DRG: 871 | End: 2020-01-01
Attending: NURSE PRACTITIONER
Payer: MEDICARE

## 2020-01-01 ENCOUNTER — APPOINTMENT (OUTPATIENT)
Dept: ULTRASOUND IMAGING | Facility: HOSPITAL | Age: 80
DRG: 252 | End: 2020-01-01
Attending: Other
Payer: MEDICARE

## 2020-01-01 ENCOUNTER — APPOINTMENT (OUTPATIENT)
Dept: MRI IMAGING | Facility: HOSPITAL | Age: 80
DRG: 871 | End: 2020-01-01
Attending: NURSE PRACTITIONER
Payer: MEDICARE

## 2020-01-01 ENCOUNTER — APPOINTMENT (OUTPATIENT)
Dept: ULTRASOUND IMAGING | Facility: HOSPITAL | Age: 80
DRG: 252 | End: 2020-01-01
Attending: HOSPITALIST
Payer: MEDICARE

## 2020-01-01 ENCOUNTER — APPOINTMENT (OUTPATIENT)
Dept: INTERVENTIONAL RADIOLOGY/VASCULAR | Facility: HOSPITAL | Age: 80
DRG: 252 | End: 2020-01-01
Attending: SURGERY
Payer: MEDICARE

## 2020-01-01 ENCOUNTER — APPOINTMENT (OUTPATIENT)
Dept: CV DIAGNOSTICS | Facility: HOSPITAL | Age: 80
DRG: 252 | End: 2020-01-01
Attending: INTERNAL MEDICINE
Payer: MEDICARE

## 2020-01-01 ENCOUNTER — APPOINTMENT (OUTPATIENT)
Dept: CT IMAGING | Facility: HOSPITAL | Age: 80
DRG: 252 | End: 2020-01-01
Attending: INTERNAL MEDICINE
Payer: MEDICARE

## 2020-01-01 ENCOUNTER — APPOINTMENT (OUTPATIENT)
Dept: MRI IMAGING | Facility: HOSPITAL | Age: 80
DRG: 252 | End: 2020-01-01
Attending: INTERNAL MEDICINE
Payer: MEDICARE

## 2020-01-01 ENCOUNTER — HOSPITAL ENCOUNTER (EMERGENCY)
Facility: HOSPITAL | Age: 80
Discharge: HOSPICE | End: 2020-01-01
Attending: EMERGENCY MEDICINE
Payer: MEDICARE

## 2020-01-01 ENCOUNTER — APPOINTMENT (OUTPATIENT)
Dept: GENERAL RADIOLOGY | Facility: HOSPITAL | Age: 80
End: 2020-01-01
Attending: EMERGENCY MEDICINE
Payer: MEDICARE

## 2020-01-01 ENCOUNTER — APPOINTMENT (OUTPATIENT)
Dept: GENERAL RADIOLOGY | Facility: HOSPITAL | Age: 80
DRG: 252 | End: 2020-01-01
Attending: PODIATRIST
Payer: MEDICARE

## 2020-01-01 ENCOUNTER — APPOINTMENT (OUTPATIENT)
Dept: ULTRASOUND IMAGING | Facility: HOSPITAL | Age: 80
DRG: 871 | End: 2020-01-01
Attending: INTERNAL MEDICINE
Payer: MEDICARE

## 2020-01-01 ENCOUNTER — HOSPITAL ENCOUNTER (INPATIENT)
Facility: HOSPITAL | Age: 80
LOS: 14 days | Discharge: HOME HEALTH | DRG: 252 | End: 2020-01-01
Attending: EMERGENCY MEDICINE | Admitting: HOSPITALIST
Payer: MEDICARE

## 2020-01-01 ENCOUNTER — APPOINTMENT (OUTPATIENT)
Dept: GENERAL RADIOLOGY | Facility: HOSPITAL | Age: 80
DRG: 871 | End: 2020-01-01
Attending: INTERNAL MEDICINE
Payer: MEDICARE

## 2020-01-01 VITALS
OXYGEN SATURATION: 97 % | TEMPERATURE: 98 F | RESPIRATION RATE: 15 BRPM | BODY MASS INDEX: 21.11 KG/M2 | DIASTOLIC BLOOD PRESSURE: 50 MMHG | SYSTOLIC BLOOD PRESSURE: 117 MMHG | WEIGHT: 123.69 LBS | HEART RATE: 97 BPM | HEIGHT: 64 IN

## 2020-01-01 VITALS
DIASTOLIC BLOOD PRESSURE: 38 MMHG | WEIGHT: 120 LBS | SYSTOLIC BLOOD PRESSURE: 68 MMHG | BODY MASS INDEX: 18.83 KG/M2 | OXYGEN SATURATION: 74 % | HEART RATE: 80 BPM | TEMPERATURE: 96 F | HEIGHT: 67 IN | RESPIRATION RATE: 34 BRPM

## 2020-01-01 VITALS
SYSTOLIC BLOOD PRESSURE: 110 MMHG | HEART RATE: 66 BPM | BODY MASS INDEX: 23 KG/M2 | WEIGHT: 128.06 LBS | OXYGEN SATURATION: 98 % | RESPIRATION RATE: 19 BRPM | TEMPERATURE: 99 F | DIASTOLIC BLOOD PRESSURE: 41 MMHG

## 2020-01-01 DIAGNOSIS — R09.02 HYPOXIA: ICD-10-CM

## 2020-01-01 DIAGNOSIS — I95.9 HYPOTENSION, UNSPECIFIED HYPOTENSION TYPE: ICD-10-CM

## 2020-01-01 DIAGNOSIS — R47.81 EPISODIC ATAXIA WITH SLURRED SPEECH (HCC): ICD-10-CM

## 2020-01-01 DIAGNOSIS — R55 SYNCOPE AND COLLAPSE: ICD-10-CM

## 2020-01-01 DIAGNOSIS — G11.8 EPISODIC ATAXIA WITH SLURRED SPEECH (HCC): ICD-10-CM

## 2020-01-01 DIAGNOSIS — A41.9 SEPSIS WITH ACUTE ORGAN DYSFUNCTION, DUE TO UNSPECIFIED ORGANISM, UNSPECIFIED TYPE, UNSPECIFIED WHETHER SEPTIC SHOCK PRESENT (HCC): ICD-10-CM

## 2020-01-01 DIAGNOSIS — R06.03 RESPIRATORY DISTRESS: ICD-10-CM

## 2020-01-01 DIAGNOSIS — R65.20 SEPSIS WITH ACUTE ORGAN DYSFUNCTION, DUE TO UNSPECIFIED ORGANISM, UNSPECIFIED TYPE, UNSPECIFIED WHETHER SEPTIC SHOCK PRESENT (HCC): ICD-10-CM

## 2020-01-01 DIAGNOSIS — R73.9 HYPERGLYCEMIA: ICD-10-CM

## 2020-01-01 DIAGNOSIS — D72.829 LEUKOCYTOSIS, UNSPECIFIED TYPE: ICD-10-CM

## 2020-01-01 DIAGNOSIS — N18.6 ESRD (END STAGE RENAL DISEASE) (HCC): Primary | ICD-10-CM

## 2020-01-01 DIAGNOSIS — I96 NECROTIC TOES (HCC): Primary | ICD-10-CM

## 2020-01-01 DIAGNOSIS — R74.8 ELEVATED LIVER ENZYMES: Primary | ICD-10-CM

## 2020-01-01 DIAGNOSIS — L03.031 CELLULITIS OF GREAT TOE, RIGHT: ICD-10-CM

## 2020-01-01 LAB
ALBUMIN SERPL-MCNC: 2 G/DL (ref 3.4–5)
ALBUMIN SERPL-MCNC: 2.2 G/DL (ref 3.4–5)
ALBUMIN SERPL-MCNC: 2.3 G/DL (ref 3.4–5)
ALBUMIN SERPL-MCNC: 2.4 G/DL (ref 3.4–5)
ALBUMIN SERPL-MCNC: 2.8 G/DL (ref 3.4–5)
ALBUMIN SERPL-MCNC: 2.9 G/DL (ref 3.4–5)
ALBUMIN SERPL-MCNC: 3.2 G/DL (ref 3.4–5)
ALBUMIN/GLOB SERPL: 0.4 {RATIO} (ref 1–2)
ALBUMIN/GLOB SERPL: 0.5 {RATIO} (ref 1–2)
ALBUMIN/GLOB SERPL: 0.6 {RATIO} (ref 1–2)
ALLENS TEST: POSITIVE
ALP LIVER SERPL-CCNC: 105 U/L (ref 45–117)
ALP LIVER SERPL-CCNC: 155 U/L (ref 45–117)
ALP LIVER SERPL-CCNC: 160 U/L (ref 45–117)
ALP LIVER SERPL-CCNC: 199 U/L (ref 45–117)
ALP LIVER SERPL-CCNC: 215 U/L (ref 45–117)
ALP LIVER SERPL-CCNC: 240 U/L (ref 45–117)
ALT SERPL-CCNC: 32 U/L (ref 16–61)
ALT SERPL-CCNC: 43 U/L (ref 16–61)
ALT SERPL-CCNC: 58 U/L (ref 16–61)
ALT SERPL-CCNC: 61 U/L (ref 16–61)
ALT SERPL-CCNC: 713 U/L (ref 16–61)
ALT SERPL-CCNC: 77 U/L (ref 16–61)
AMMONIA PLAS-MCNC: 22 UMOL/L (ref 11–32)
ANION GAP SERPL CALC-SCNC: 11 MMOL/L (ref 0–18)
ANION GAP SERPL CALC-SCNC: 12 MMOL/L (ref 0–18)
ANION GAP SERPL CALC-SCNC: 13 MMOL/L (ref 0–18)
ANION GAP SERPL CALC-SCNC: 16 MMOL/L (ref 0–18)
ANION GAP SERPL CALC-SCNC: 5 MMOL/L (ref 0–18)
ANION GAP SERPL CALC-SCNC: 5 MMOL/L (ref 0–18)
ANION GAP SERPL CALC-SCNC: 7 MMOL/L (ref 0–18)
ANION GAP SERPL CALC-SCNC: 8 MMOL/L (ref 0–18)
ANION GAP SERPL CALC-SCNC: 9 MMOL/L (ref 0–18)
APTT PPP: 38.4 SECONDS (ref 25.4–36.1)
ARTERIAL BLD GAS O2 SATURATION: 95 % (ref 92–100)
ARTERIAL BLOOD GAS BASE EXCESS: 2.7 MMOL/L (ref ?–2)
ARTERIAL BLOOD GAS HCO3: 27.4 MEQ/L (ref 22–26)
ARTERIAL BLOOD GAS PCO2: 42 MM HG (ref 35–45)
ARTERIAL BLOOD GAS PH: 7.43 (ref 7.35–7.45)
ARTERIAL BLOOD GAS PO2: 80 MM HG (ref 80–105)
AST SERPL-CCNC: 114 U/L (ref 15–37)
AST SERPL-CCNC: 28 U/L (ref 15–37)
AST SERPL-CCNC: 285 U/L (ref 15–37)
AST SERPL-CCNC: 3338 U/L (ref 15–37)
AST SERPL-CCNC: 352 U/L (ref 15–37)
AST SERPL-CCNC: 418 U/L (ref 15–37)
ATRIAL RATE: 72 BPM
ATRIAL RATE: 91 BPM
ATRIAL RATE: 99 BPM
BASOPHILS # BLD AUTO: 0.06 X10(3) UL (ref 0–0.2)
BASOPHILS # BLD AUTO: 0.07 X10(3) UL (ref 0–0.2)
BASOPHILS # BLD AUTO: 0.08 X10(3) UL (ref 0–0.2)
BASOPHILS # BLD AUTO: 0.1 X10(3) UL (ref 0–0.2)
BASOPHILS # BLD: 0 X10(3) UL (ref 0–0.2)
BASOPHILS NFR BLD AUTO: 0.4 %
BASOPHILS NFR BLD AUTO: 0.5 %
BASOPHILS NFR BLD AUTO: 0.7 %
BASOPHILS NFR BLD AUTO: 0.9 %
BASOPHILS NFR BLD AUTO: 0.9 %
BASOPHILS NFR BLD AUTO: 1 %
BASOPHILS NFR BLD AUTO: 1.2 %
BASOPHILS NFR BLD AUTO: 1.3 %
BASOPHILS NFR BLD: 0 %
BILIRUB DIRECT SERPL-MCNC: 0.4 MG/DL (ref 0–0.2)
BILIRUB SERPL-MCNC: 0.6 MG/DL (ref 0.1–2)
BILIRUB SERPL-MCNC: 0.7 MG/DL (ref 0.1–2)
BILIRUB SERPL-MCNC: 0.7 MG/DL (ref 0.1–2)
BILIRUB SERPL-MCNC: 0.8 MG/DL (ref 0.1–2)
BILIRUB SERPL-MCNC: 1 MG/DL (ref 0.1–2)
BILIRUB SERPL-MCNC: 1.4 MG/DL (ref 0.1–2)
BUN BLD-MCNC: 14 MG/DL (ref 7–18)
BUN BLD-MCNC: 18 MG/DL (ref 7–18)
BUN BLD-MCNC: 21 MG/DL (ref 7–18)
BUN BLD-MCNC: 28 MG/DL (ref 7–18)
BUN BLD-MCNC: 29 MG/DL (ref 7–18)
BUN BLD-MCNC: 29 MG/DL (ref 7–18)
BUN BLD-MCNC: 32 MG/DL (ref 7–18)
BUN BLD-MCNC: 38 MG/DL (ref 7–18)
BUN BLD-MCNC: 39 MG/DL (ref 7–18)
BUN BLD-MCNC: 41 MG/DL (ref 7–18)
BUN BLD-MCNC: 47 MG/DL (ref 7–18)
BUN/CREAT SERPL: 3.2 (ref 10–20)
BUN/CREAT SERPL: 4.3 (ref 10–20)
BUN/CREAT SERPL: 4.4 (ref 10–20)
BUN/CREAT SERPL: 4.4 (ref 10–20)
BUN/CREAT SERPL: 4.7 (ref 10–20)
BUN/CREAT SERPL: 4.7 (ref 10–20)
BUN/CREAT SERPL: 4.8 (ref 10–20)
BUN/CREAT SERPL: 5.3 (ref 10–20)
BUN/CREAT SERPL: 5.3 (ref 10–20)
BUN/CREAT SERPL: 5.9 (ref 10–20)
BUN/CREAT SERPL: 6 (ref 10–20)
BUN/CREAT SERPL: 6.7 (ref 10–20)
BUN/CREAT SERPL: 7.1 (ref 10–20)
C DIFF TOX B STL QL: NEGATIVE
CALCIUM BLD-MCNC: 8.4 MG/DL (ref 8.5–10.1)
CALCIUM BLD-MCNC: 8.5 MG/DL (ref 8.5–10.1)
CALCIUM BLD-MCNC: 8.7 MG/DL (ref 8.5–10.1)
CALCIUM BLD-MCNC: 8.8 MG/DL (ref 8.5–10.1)
CALCIUM BLD-MCNC: 8.9 MG/DL (ref 8.5–10.1)
CALCIUM BLD-MCNC: 9.1 MG/DL (ref 8.5–10.1)
CALCIUM BLD-MCNC: 9.1 MG/DL (ref 8.5–10.1)
CALCIUM BLD-MCNC: 9.2 MG/DL (ref 8.5–10.1)
CALCIUM BLD-MCNC: 9.5 MG/DL (ref 8.5–10.1)
CALCIUM BLD-MCNC: 9.5 MG/DL (ref 8.5–10.1)
CALCIUM BLD-MCNC: 9.9 MG/DL (ref 8.5–10.1)
CALCULATED O2 SATURATION: 97 % (ref 92–100)
CARBOXYHEMOGLOBIN: 1.8 % SAT (ref 0–3)
CHLORIDE SERPL-SCNC: 100 MMOL/L (ref 98–112)
CHLORIDE SERPL-SCNC: 101 MMOL/L (ref 98–112)
CHLORIDE SERPL-SCNC: 102 MMOL/L (ref 98–112)
CHLORIDE SERPL-SCNC: 103 MMOL/L (ref 98–112)
CHLORIDE SERPL-SCNC: 104 MMOL/L (ref 98–112)
CHLORIDE SERPL-SCNC: 104 MMOL/L (ref 98–112)
CHLORIDE SERPL-SCNC: 105 MMOL/L (ref 98–112)
CHLORIDE SERPL-SCNC: 108 MMOL/L (ref 98–112)
CHLORIDE SERPL-SCNC: 98 MMOL/L (ref 98–112)
CHLORIDE SERPL-SCNC: 99 MMOL/L (ref 98–112)
CHLORIDE SERPL-SCNC: 99 MMOL/L (ref 98–112)
CHOLEST SMN-MCNC: 166 MG/DL (ref ?–200)
CK SERPL-CCNC: 234 U/L (ref 39–308)
CK SERPL-CCNC: 298 U/L (ref 39–308)
CO2 SERPL-SCNC: 19 MMOL/L (ref 21–32)
CO2 SERPL-SCNC: 22 MMOL/L (ref 21–32)
CO2 SERPL-SCNC: 23 MMOL/L (ref 21–32)
CO2 SERPL-SCNC: 23 MMOL/L (ref 21–32)
CO2 SERPL-SCNC: 25 MMOL/L (ref 21–32)
CO2 SERPL-SCNC: 25 MMOL/L (ref 21–32)
CO2 SERPL-SCNC: 26 MMOL/L (ref 21–32)
CO2 SERPL-SCNC: 26 MMOL/L (ref 21–32)
CO2 SERPL-SCNC: 27 MMOL/L (ref 21–32)
CO2 SERPL-SCNC: 28 MMOL/L (ref 21–32)
CO2 SERPL-SCNC: 32 MMOL/L (ref 21–32)
CO2 SERPL-SCNC: 32 MMOL/L (ref 21–32)
CO2 SERPL-SCNC: 35 MMOL/L (ref 21–32)
CREAT BLD-MCNC: 2.89 MG/DL (ref 0.7–1.3)
CREAT BLD-MCNC: 3.98 MG/DL (ref 0.7–1.3)
CREAT BLD-MCNC: 5.34 MG/DL (ref 0.7–1.3)
CREAT BLD-MCNC: 5.56 MG/DL (ref 0.7–1.3)
CREAT BLD-MCNC: 5.68 MG/DL (ref 0.7–1.3)
CREAT BLD-MCNC: 6.49 MG/DL (ref 0.7–1.3)
CREAT BLD-MCNC: 6.62 MG/DL (ref 0.7–1.3)
CREAT BLD-MCNC: 6.63 MG/DL (ref 0.7–1.3)
CREAT BLD-MCNC: 7.35 MG/DL (ref 0.7–1.3)
CREAT BLD-MCNC: 8.28 MG/DL (ref 0.7–1.3)
CREAT BLD-MCNC: 8.67 MG/DL (ref 0.7–1.3)
CRP SERPL-MCNC: 11.1 MG/DL (ref ?–0.3)
DEPRECATED HBV CORE AB SER IA-ACNC: 115.3 NG/ML (ref 30–530)
DEPRECATED RDW RBC AUTO: 53.2 FL (ref 35.1–46.3)
DEPRECATED RDW RBC AUTO: 54.5 FL (ref 35.1–46.3)
DEPRECATED RDW RBC AUTO: 54.8 FL (ref 35.1–46.3)
DEPRECATED RDW RBC AUTO: 55 FL (ref 35.1–46.3)
DEPRECATED RDW RBC AUTO: 55.4 FL (ref 35.1–46.3)
DEPRECATED RDW RBC AUTO: 55.6 FL (ref 35.1–46.3)
DEPRECATED RDW RBC AUTO: 55.8 FL (ref 35.1–46.3)
DEPRECATED RDW RBC AUTO: 56 FL (ref 35.1–46.3)
DEPRECATED RDW RBC AUTO: 56 FL (ref 35.1–46.3)
DEPRECATED RDW RBC AUTO: 57.8 FL (ref 35.1–46.3)
DEPRECATED RDW RBC AUTO: 58.4 FL (ref 35.1–46.3)
DEPRECATED RDW RBC AUTO: 59.6 FL (ref 35.1–46.3)
EOSINOPHIL # BLD AUTO: 0.03 X10(3) UL (ref 0–0.7)
EOSINOPHIL # BLD AUTO: 0.12 X10(3) UL (ref 0–0.7)
EOSINOPHIL # BLD AUTO: 0.16 X10(3) UL (ref 0–0.7)
EOSINOPHIL # BLD AUTO: 0.17 X10(3) UL (ref 0–0.7)
EOSINOPHIL # BLD AUTO: 0.18 X10(3) UL (ref 0–0.7)
EOSINOPHIL # BLD AUTO: 0.21 X10(3) UL (ref 0–0.7)
EOSINOPHIL # BLD AUTO: 0.22 X10(3) UL (ref 0–0.7)
EOSINOPHIL # BLD AUTO: 0.25 X10(3) UL (ref 0–0.7)
EOSINOPHIL # BLD: 0 X10(3) UL (ref 0–0.7)
EOSINOPHIL NFR BLD AUTO: 0.2 %
EOSINOPHIL NFR BLD AUTO: 1.4 %
EOSINOPHIL NFR BLD AUTO: 1.5 %
EOSINOPHIL NFR BLD AUTO: 1.9 %
EOSINOPHIL NFR BLD AUTO: 2 %
EOSINOPHIL NFR BLD AUTO: 2.2 %
EOSINOPHIL NFR BLD AUTO: 2.2 %
EOSINOPHIL NFR BLD AUTO: 2.3 %
EOSINOPHIL NFR BLD: 0 %
ERYTHROCYTE [DISTWIDTH] IN BLOOD BY AUTOMATED COUNT: 21.5 % (ref 11–15)
ERYTHROCYTE [DISTWIDTH] IN BLOOD BY AUTOMATED COUNT: 21.7 % (ref 11–15)
ERYTHROCYTE [DISTWIDTH] IN BLOOD BY AUTOMATED COUNT: 21.7 % (ref 11–15)
ERYTHROCYTE [DISTWIDTH] IN BLOOD BY AUTOMATED COUNT: 21.9 % (ref 11–15)
ERYTHROCYTE [DISTWIDTH] IN BLOOD BY AUTOMATED COUNT: 21.9 % (ref 11–15)
ERYTHROCYTE [DISTWIDTH] IN BLOOD BY AUTOMATED COUNT: 22 % (ref 11–15)
ERYTHROCYTE [DISTWIDTH] IN BLOOD BY AUTOMATED COUNT: 22.3 % (ref 11–15)
ERYTHROCYTE [DISTWIDTH] IN BLOOD BY AUTOMATED COUNT: 22.3 % (ref 11–15)
ERYTHROCYTE [DISTWIDTH] IN BLOOD BY AUTOMATED COUNT: 22.5 % (ref 11–15)
ERYTHROCYTE [DISTWIDTH] IN BLOOD BY AUTOMATED COUNT: 22.8 % (ref 11–15)
ERYTHROCYTE [DISTWIDTH] IN BLOOD BY AUTOMATED COUNT: 22.9 % (ref 11–15)
ERYTHROCYTE [DISTWIDTH] IN BLOOD BY AUTOMATED COUNT: 23.1 % (ref 11–15)
EST. AVERAGE GLUCOSE BLD GHB EST-MCNC: 126 MG/DL (ref 68–126)
GLOBULIN PLAS-MCNC: 5.1 G/DL (ref 2.8–4.4)
GLOBULIN PLAS-MCNC: 5.1 G/DL (ref 2.8–4.4)
GLOBULIN PLAS-MCNC: 5.4 G/DL (ref 2.8–4.4)
GLOBULIN PLAS-MCNC: 5.4 G/DL (ref 2.8–4.4)
GLOBULIN PLAS-MCNC: 6.2 G/DL (ref 2.8–4.4)
GLUCOSE BLD-MCNC: 102 MG/DL (ref 70–99)
GLUCOSE BLD-MCNC: 103 MG/DL (ref 70–99)
GLUCOSE BLD-MCNC: 106 MG/DL (ref 70–99)
GLUCOSE BLD-MCNC: 110 MG/DL (ref 70–99)
GLUCOSE BLD-MCNC: 111 MG/DL (ref 70–99)
GLUCOSE BLD-MCNC: 113 MG/DL (ref 70–99)
GLUCOSE BLD-MCNC: 114 MG/DL (ref 70–99)
GLUCOSE BLD-MCNC: 116 MG/DL (ref 70–99)
GLUCOSE BLD-MCNC: 118 MG/DL (ref 70–99)
GLUCOSE BLD-MCNC: 120 MG/DL (ref 70–99)
GLUCOSE BLD-MCNC: 131 MG/DL (ref 70–99)
GLUCOSE BLD-MCNC: 142 MG/DL (ref 70–99)
GLUCOSE BLD-MCNC: 145 MG/DL (ref 70–99)
GLUCOSE BLD-MCNC: 150 MG/DL (ref 70–99)
GLUCOSE BLD-MCNC: 155 MG/DL (ref 70–99)
GLUCOSE BLD-MCNC: 156 MG/DL (ref 70–99)
GLUCOSE BLD-MCNC: 157 MG/DL (ref 70–99)
GLUCOSE BLD-MCNC: 157 MG/DL (ref 70–99)
GLUCOSE BLD-MCNC: 158 MG/DL (ref 70–99)
GLUCOSE BLD-MCNC: 163 MG/DL (ref 70–99)
GLUCOSE BLD-MCNC: 171 MG/DL (ref 70–99)
GLUCOSE BLD-MCNC: 172 MG/DL (ref 70–99)
GLUCOSE BLD-MCNC: 173 MG/DL (ref 70–99)
GLUCOSE BLD-MCNC: 183 MG/DL (ref 70–99)
GLUCOSE BLD-MCNC: 186 MG/DL (ref 70–99)
GLUCOSE BLD-MCNC: 186 MG/DL (ref 70–99)
GLUCOSE BLD-MCNC: 191 MG/DL (ref 70–99)
GLUCOSE BLD-MCNC: 201 MG/DL (ref 70–99)
GLUCOSE BLD-MCNC: 203 MG/DL (ref 70–99)
GLUCOSE BLD-MCNC: 231 MG/DL (ref 70–99)
GLUCOSE BLD-MCNC: 236 MG/DL (ref 70–99)
GLUCOSE BLD-MCNC: 241 MG/DL (ref 70–99)
GLUCOSE BLD-MCNC: 253 MG/DL (ref 70–99)
GLUCOSE BLD-MCNC: 269 MG/DL (ref 70–99)
GLUCOSE BLD-MCNC: 75 MG/DL (ref 70–99)
HAV IGM SER QL: 2.3 MG/DL (ref 1.6–2.6)
HAV IGM SER QL: 2.4 MG/DL (ref 1.6–2.6)
HBA1C MFR BLD HPLC: 6 % (ref ?–5.7)
HBV SURFACE AG SER-ACNC: <0.1 [IU]/L
HBV SURFACE AG SERPL QL IA: NONREACTIVE
HCT VFR BLD AUTO: 26.4 % (ref 39–53)
HCT VFR BLD AUTO: 26.4 % (ref 39–53)
HCT VFR BLD AUTO: 26.5 % (ref 39–53)
HCT VFR BLD AUTO: 26.8 % (ref 39–53)
HCT VFR BLD AUTO: 27.2 % (ref 39–53)
HCT VFR BLD AUTO: 27.6 % (ref 39–53)
HCT VFR BLD AUTO: 28.5 % (ref 39–53)
HCT VFR BLD AUTO: 29.2 % (ref 39–53)
HCT VFR BLD AUTO: 29.4 % (ref 39–53)
HCT VFR BLD AUTO: 31.7 % (ref 39–53)
HCT VFR BLD AUTO: 31.8 % (ref 39–53)
HCT VFR BLD AUTO: 31.8 % (ref 39–53)
HDLC SERPL-MCNC: 44 MG/DL (ref 40–59)
HELMET CELLS BLD QL SMEAR: PRESENT
HGB BLD-MCNC: 7.2 G/DL (ref 13–17.5)
HGB BLD-MCNC: 7.5 G/DL (ref 13–17.5)
HGB BLD-MCNC: 7.5 G/DL (ref 13–17.5)
HGB BLD-MCNC: 7.7 G/DL (ref 13–17.5)
HGB BLD-MCNC: 7.7 G/DL (ref 13–17.5)
HGB BLD-MCNC: 8 G/DL (ref 13–17.5)
HGB BLD-MCNC: 8.1 G/DL (ref 13–17.5)
HGB BLD-MCNC: 8.1 G/DL (ref 13–17.5)
HGB BLD-MCNC: 8.5 G/DL (ref 13–17.5)
HGB BLD-MCNC: 8.8 G/DL (ref 13–17.5)
HGB BLD-MCNC: 8.8 G/DL (ref 13–17.5)
HGB BLD-MCNC: 8.9 G/DL (ref 13–17.5)
IMM GRANULOCYTES # BLD AUTO: 0.02 X10(3) UL (ref 0–1)
IMM GRANULOCYTES # BLD AUTO: 0.03 X10(3) UL (ref 0–1)
IMM GRANULOCYTES # BLD AUTO: 0.04 X10(3) UL (ref 0–1)
IMM GRANULOCYTES # BLD AUTO: 0.05 X10(3) UL (ref 0–1)
IMM GRANULOCYTES # BLD AUTO: 0.06 X10(3) UL (ref 0–1)
IMM GRANULOCYTES # BLD AUTO: 0.06 X10(3) UL (ref 0–1)
IMM GRANULOCYTES NFR BLD: 0.2 %
IMM GRANULOCYTES NFR BLD: 0.3 %
IMM GRANULOCYTES NFR BLD: 0.3 %
IMM GRANULOCYTES NFR BLD: 0.4 %
IMM GRANULOCYTES NFR BLD: 0.5 %
INR BLD: 1.26 (ref 0.9–1.1)
IONIZED CALCIUM: 1.17 MMOL/L (ref 1.12–1.32)
IRON SATURATION: 11 % (ref 20–50)
IRON SERPL-MCNC: 25 UG/DL (ref 65–175)
ISTAT ACTIVATED CLOTTING TIME: 290 SECONDS (ref 74–137)
LACTATE SERPL-SCNC: 1.7 MMOL/L (ref 0.4–2)
LACTATE SERPL-SCNC: 3.2 MMOL/L (ref 0.4–2)
LACTATE SERPL-SCNC: 3.3 MMOL/L (ref 0.4–2)
LACTATE SERPL-SCNC: 3.3 MMOL/L (ref 0.4–2)
LACTIC ACID ARTERIAL: <1.6 MMOL/L (ref 0.5–2)
LDLC SERPL CALC-MCNC: 95 MG/DL (ref ?–100)
LEVETIRACETAM (KEPPRA): 16 UG/ML
LEVETIRACETAM (KEPPRA): 18 UG/ML
LEVETIRACETAM (KEPPRA): 3 UG/ML
LEVETIRACETAM (KEPPRA): 59 UG/ML
LEVETIRACETAM (KEPPRA): 71 UG/ML
LYMPHOCYTES # BLD AUTO: 0.81 X10(3) UL (ref 1–4)
LYMPHOCYTES # BLD AUTO: 1.5 X10(3) UL (ref 1–4)
LYMPHOCYTES # BLD AUTO: 1.65 X10(3) UL (ref 1–4)
LYMPHOCYTES # BLD AUTO: 1.67 X10(3) UL (ref 1–4)
LYMPHOCYTES # BLD AUTO: 1.67 X10(3) UL (ref 1–4)
LYMPHOCYTES # BLD AUTO: 1.68 X10(3) UL (ref 1–4)
LYMPHOCYTES # BLD AUTO: 1.72 X10(3) UL (ref 1–4)
LYMPHOCYTES # BLD AUTO: 1.84 X10(3) UL (ref 1–4)
LYMPHOCYTES NFR BLD AUTO: 14.6 %
LYMPHOCYTES NFR BLD AUTO: 14.8 %
LYMPHOCYTES NFR BLD AUTO: 16.2 %
LYMPHOCYTES NFR BLD AUTO: 17.9 %
LYMPHOCYTES NFR BLD AUTO: 18.3 %
LYMPHOCYTES NFR BLD AUTO: 20.1 %
LYMPHOCYTES NFR BLD AUTO: 22.2 %
LYMPHOCYTES NFR BLD AUTO: 4.4 %
LYMPHOCYTES NFR BLD: 1.1 X10(3) UL (ref 1–4)
LYMPHOCYTES NFR BLD: 6 %
M PROTEIN MFR SERPL ELPH: 7.3 G/DL (ref 6.4–8.2)
M PROTEIN MFR SERPL ELPH: 7.4 G/DL (ref 6.4–8.2)
M PROTEIN MFR SERPL ELPH: 7.7 G/DL (ref 6.4–8.2)
M PROTEIN MFR SERPL ELPH: 8.3 G/DL (ref 6.4–8.2)
M PROTEIN MFR SERPL ELPH: 8.4 G/DL (ref 6.4–8.2)
M PROTEIN MFR SERPL ELPH: 9 G/DL (ref 6.4–8.2)
MCH RBC QN AUTO: 20.2 PG (ref 26–34)
MCH RBC QN AUTO: 20.5 PG (ref 26–34)
MCH RBC QN AUTO: 20.6 PG (ref 26–34)
MCH RBC QN AUTO: 20.7 PG (ref 26–34)
MCH RBC QN AUTO: 20.9 PG (ref 26–34)
MCH RBC QN AUTO: 21.2 PG (ref 26–34)
MCH RBC QN AUTO: 21.3 PG (ref 26–34)
MCHC RBC AUTO-ENTMCNC: 27.3 G/DL (ref 31–37)
MCHC RBC AUTO-ENTMCNC: 27.7 G/DL (ref 31–37)
MCHC RBC AUTO-ENTMCNC: 27.7 G/DL (ref 31–37)
MCHC RBC AUTO-ENTMCNC: 27.8 G/DL (ref 31–37)
MCHC RBC AUTO-ENTMCNC: 27.9 G/DL (ref 31–37)
MCHC RBC AUTO-ENTMCNC: 28 G/DL (ref 31–37)
MCHC RBC AUTO-ENTMCNC: 28 G/DL (ref 31–37)
MCHC RBC AUTO-ENTMCNC: 28.4 G/DL (ref 31–37)
MCHC RBC AUTO-ENTMCNC: 28.4 G/DL (ref 31–37)
MCHC RBC AUTO-ENTMCNC: 28.9 G/DL (ref 31–37)
MCHC RBC AUTO-ENTMCNC: 29.1 G/DL (ref 31–37)
MCHC RBC AUTO-ENTMCNC: 29.4 G/DL (ref 31–37)
MCV RBC AUTO: 72 FL (ref 80–100)
MCV RBC AUTO: 72.4 FL (ref 80–100)
MCV RBC AUTO: 72.5 FL (ref 80–100)
MCV RBC AUTO: 72.9 FL (ref 80–100)
MCV RBC AUTO: 73.2 FL (ref 80–100)
MCV RBC AUTO: 73.7 FL (ref 80–100)
MCV RBC AUTO: 73.8 FL (ref 80–100)
MCV RBC AUTO: 73.9 FL (ref 80–100)
MCV RBC AUTO: 74.1 FL (ref 80–100)
MCV RBC AUTO: 74.1 FL (ref 80–100)
MCV RBC AUTO: 75.5 FL (ref 80–100)
MCV RBC AUTO: 75.9 FL (ref 80–100)
METHEMOGLOBIN: 0.4 % SAT (ref 0.4–1.5)
MONOCYTES # BLD AUTO: 1.04 X10(3) UL (ref 0.1–1)
MONOCYTES # BLD AUTO: 1.09 X10(3) UL (ref 0.1–1)
MONOCYTES # BLD AUTO: 1.13 X10(3) UL (ref 0.1–1)
MONOCYTES # BLD AUTO: 1.17 X10(3) UL (ref 0.1–1)
MONOCYTES # BLD AUTO: 1.26 X10(3) UL (ref 0.1–1)
MONOCYTES # BLD AUTO: 1.53 X10(3) UL (ref 0.1–1)
MONOCYTES # BLD AUTO: 1.63 X10(3) UL (ref 0.1–1)
MONOCYTES # BLD AUTO: 1.71 X10(3) UL (ref 0.1–1)
MONOCYTES # BLD: 1.29 X10(3) UL (ref 0.1–1)
MONOCYTES NFR BLD AUTO: 14.1 %
MONOCYTES NFR BLD AUTO: 14.3 %
MONOCYTES NFR BLD AUTO: 14.5 %
MONOCYTES NFR BLD AUTO: 15 %
MONOCYTES NFR BLD AUTO: 15.4 %
MONOCYTES NFR BLD AUTO: 16.3 %
MONOCYTES NFR BLD AUTO: 5.7 %
MONOCYTES NFR BLD AUTO: 9.8 %
MONOCYTES NFR BLD: 7 %
NEUTROPHILS # BLD AUTO: 15.26 X10 (3) UL (ref 1.5–7.7)
NEUTROPHILS # BLD AUTO: 16.3 X10 (3) UL (ref 1.5–7.7)
NEUTROPHILS # BLD AUTO: 16.3 X10(3) UL (ref 1.5–7.7)
NEUTROPHILS # BLD AUTO: 4.43 X10 (3) UL (ref 1.5–7.7)
NEUTROPHILS # BLD AUTO: 4.43 X10(3) UL (ref 1.5–7.7)
NEUTROPHILS # BLD AUTO: 5.17 X10 (3) UL (ref 1.5–7.7)
NEUTROPHILS # BLD AUTO: 5.17 X10(3) UL (ref 1.5–7.7)
NEUTROPHILS # BLD AUTO: 5.23 X10 (3) UL (ref 1.5–7.7)
NEUTROPHILS # BLD AUTO: 5.23 X10(3) UL (ref 1.5–7.7)
NEUTROPHILS # BLD AUTO: 5.82 X10 (3) UL (ref 1.5–7.7)
NEUTROPHILS # BLD AUTO: 5.82 X10(3) UL (ref 1.5–7.7)
NEUTROPHILS # BLD AUTO: 7.53 X10 (3) UL (ref 1.5–7.7)
NEUTROPHILS # BLD AUTO: 7.53 X10(3) UL (ref 1.5–7.7)
NEUTROPHILS # BLD AUTO: 8.06 X10 (3) UL (ref 1.5–7.7)
NEUTROPHILS # BLD AUTO: 8.06 X10 (3) UL (ref 1.5–7.7)
NEUTROPHILS # BLD AUTO: 8.06 X10(3) UL (ref 1.5–7.7)
NEUTROPHILS # BLD AUTO: 8.06 X10(3) UL (ref 1.5–7.7)
NEUTROPHILS NFR BLD AUTO: 58.8 %
NEUTROPHILS NFR BLD AUTO: 62.3 %
NEUTROPHILS NFR BLD AUTO: 62.8 %
NEUTROPHILS NFR BLD AUTO: 63.1 %
NEUTROPHILS NFR BLD AUTO: 66.3 %
NEUTROPHILS NFR BLD AUTO: 68.2 %
NEUTROPHILS NFR BLD AUTO: 72.4 %
NEUTROPHILS NFR BLD AUTO: 89 %
NEUTROPHILS NFR BLD: 86 %
NEUTS BAND NFR BLD: 1 %
NEUTS HYPERSEG # BLD: 16.01 X10(3) UL (ref 1.5–7.7)
NONHDLC SERPL-MCNC: 122 MG/DL (ref ?–130)
OSMOLALITY SERPL CALC.SUM OF ELEC: 283 MOSM/KG (ref 275–295)
OSMOLALITY SERPL CALC.SUM OF ELEC: 284 MOSM/KG (ref 275–295)
OSMOLALITY SERPL CALC.SUM OF ELEC: 289 MOSM/KG (ref 275–295)
OSMOLALITY SERPL CALC.SUM OF ELEC: 291 MOSM/KG (ref 275–295)
OSMOLALITY SERPL CALC.SUM OF ELEC: 292 MOSM/KG (ref 275–295)
OSMOLALITY SERPL CALC.SUM OF ELEC: 293 MOSM/KG (ref 275–295)
OSMOLALITY SERPL CALC.SUM OF ELEC: 296 MOSM/KG (ref 275–295)
OSMOLALITY SERPL CALC.SUM OF ELEC: 297 MOSM/KG (ref 275–295)
OSMOLALITY SERPL CALC.SUM OF ELEC: 299 MOSM/KG (ref 275–295)
OSMOLALITY SERPL CALC.SUM OF ELEC: 300 MOSM/KG (ref 275–295)
OSMOLALITY SERPL CALC.SUM OF ELEC: 301 MOSM/KG (ref 275–295)
OSMOLALITY SERPL CALC.SUM OF ELEC: 307 MOSM/KG (ref 275–295)
OSMOLALITY SERPL CALC.SUM OF ELEC: 308 MOSM/KG (ref 275–295)
P AXIS: 29 DEGREES
P AXIS: 52 DEGREES
P AXIS: 66 DEGREES
P-R INTERVAL: 144 MS
P-R INTERVAL: 164 MS
P-R INTERVAL: 192 MS
P/F RATIO: 398.4 MMHG
PATIENT TEMPERATURE: 97.5 F
PHOSPHATE SERPL-MCNC: 2.2 MG/DL (ref 2.5–4.9)
PHOSPHATE SERPL-MCNC: 3.1 MG/DL (ref 2.5–4.9)
PLATELET # BLD AUTO: 337 10(3)UL (ref 150–450)
PLATELET # BLD AUTO: 444 10(3)UL (ref 150–450)
PLATELET # BLD AUTO: 449 10(3)UL (ref 150–450)
PLATELET # BLD AUTO: 449 10(3)UL (ref 150–450)
PLATELET # BLD AUTO: 451 10(3)UL (ref 150–450)
PLATELET # BLD AUTO: 520 10(3)UL (ref 150–450)
PLATELET # BLD AUTO: 544 10(3)UL (ref 150–450)
PLATELET # BLD AUTO: 577 10(3)UL (ref 150–450)
PLATELET # BLD AUTO: 609 10(3)UL (ref 150–450)
PLATELET # BLD AUTO: 630 10(3)UL (ref 150–450)
PLATELET # BLD AUTO: 647 10(3)UL (ref 150–450)
PLATELET # BLD AUTO: 669 10(3)UL (ref 150–450)
PLATELET MORPHOLOGY: NORMAL
POTASSIUM BLOOD GAS: 3.4 MMOL/L (ref 3.6–5.1)
POTASSIUM SERPL-SCNC: 3.3 MMOL/L (ref 3.5–5.1)
POTASSIUM SERPL-SCNC: 3.5 MMOL/L (ref 3.5–5.1)
POTASSIUM SERPL-SCNC: 3.6 MMOL/L (ref 3.5–5.1)
POTASSIUM SERPL-SCNC: 3.8 MMOL/L (ref 3.5–5.1)
POTASSIUM SERPL-SCNC: 3.9 MMOL/L (ref 3.5–5.1)
POTASSIUM SERPL-SCNC: 4 MMOL/L (ref 3.5–5.1)
POTASSIUM SERPL-SCNC: 4.2 MMOL/L (ref 3.5–5.1)
POTASSIUM SERPL-SCNC: 4.9 MMOL/L (ref 3.5–5.1)
PROCALCITONIN SERPL-MCNC: 4.31 NG/ML
PSA SERPL DL<=0.01 NG/ML-MCNC: 16.4 SECONDS (ref 12.5–14.7)
Q-T INTERVAL: 434 MS
Q-T INTERVAL: 486 MS
Q-T INTERVAL: 494 MS
QRS DURATION: 130 MS
QRS DURATION: 134 MS
QRS DURATION: 138 MS
QTC CALCULATION (BEZET): 540 MS
QTC CALCULATION (BEZET): 556 MS
QTC CALCULATION (BEZET): 597 MS
R AXIS: -23 DEGREES
R AXIS: 19 DEGREES
R AXIS: 2 DEGREES
RBC # BLD AUTO: 3.57 X10(6)UL (ref 3.8–5.8)
RBC # BLD AUTO: 3.58 X10(6)UL (ref 3.8–5.8)
RBC # BLD AUTO: 3.66 X10(6)UL (ref 3.8–5.8)
RBC # BLD AUTO: 3.68 X10(6)UL (ref 3.8–5.8)
RBC # BLD AUTO: 3.74 X10(6)UL (ref 3.8–5.8)
RBC # BLD AUTO: 3.75 X10(6)UL (ref 3.8–5.8)
RBC # BLD AUTO: 3.91 X10(6)UL (ref 3.8–5.8)
RBC # BLD AUTO: 3.94 X10(6)UL (ref 3.8–5.8)
RBC # BLD AUTO: 4.06 X10(6)UL (ref 3.8–5.8)
RBC # BLD AUTO: 4.19 X10(6)UL (ref 3.8–5.8)
RBC # BLD AUTO: 4.21 X10(6)UL (ref 3.8–5.8)
RBC # BLD AUTO: 4.28 X10(6)UL (ref 3.8–5.8)
SODIUM BLOOD GAS: 140 MMOL/L (ref 136–144)
SODIUM SERPL-SCNC: 134 MMOL/L (ref 136–145)
SODIUM SERPL-SCNC: 136 MMOL/L (ref 136–145)
SODIUM SERPL-SCNC: 137 MMOL/L (ref 136–145)
SODIUM SERPL-SCNC: 138 MMOL/L (ref 136–145)
SODIUM SERPL-SCNC: 139 MMOL/L (ref 136–145)
SODIUM SERPL-SCNC: 141 MMOL/L (ref 136–145)
SODIUM SERPL-SCNC: 141 MMOL/L (ref 136–145)
T AXIS: 127 DEGREES
T AXIS: 138 DEGREES
T AXIS: 91 DEGREES
TOTAL CELLS COUNTED: 100
TOTAL HEMOGLOBIN: 8.1 G/DL (ref 12.6–17.4)
TOTAL IRON BINDING CAPACITY: 232 UG/DL (ref 240–450)
TRANSFERRIN SERPL-MCNC: 156 MG/DL (ref 200–360)
TRIGL SERPL-MCNC: 133 MG/DL (ref 30–149)
TROPONIN I SERPL-MCNC: <0.045 NG/ML (ref ?–0.04)
URATE SERPL-MCNC: 4.4 MG/DL (ref 3.5–7.2)
VANCOMYCIN TROUGH SERPL-MCNC: 19.7 UG/ML (ref 10–20)
VENTRICULAR RATE: 72 BPM
VENTRICULAR RATE: 91 BPM
VENTRICULAR RATE: 99 BPM
VITAMIN B1 (THIAMINE), WHOLE B: 260 NMOL/L
VLDLC SERPL CALC-MCNC: 27 MG/DL (ref 0–30)
WBC # BLD AUTO: 11.1 X10(3) UL (ref 4–11)
WBC # BLD AUTO: 11.4 X10(3) UL (ref 4–11)
WBC # BLD AUTO: 11.8 X10(3) UL (ref 4–11)
WBC # BLD AUTO: 12.3 X10(3) UL (ref 4–11)
WBC # BLD AUTO: 13 X10(3) UL (ref 4–11)
WBC # BLD AUTO: 16.3 X10(3) UL (ref 4–11)
WBC # BLD AUTO: 18.3 X10(3) UL (ref 4–11)
WBC # BLD AUTO: 18.4 X10(3) UL (ref 4–11)
WBC # BLD AUTO: 7.5 X10(3) UL (ref 4–11)
WBC # BLD AUTO: 8.2 X10(3) UL (ref 4–11)
WBC # BLD AUTO: 8.3 X10(3) UL (ref 4–11)
WBC # BLD AUTO: 9.4 X10(3) UL (ref 4–11)

## 2020-01-01 PROCEDURE — 70450 CT HEAD/BRAIN W/O DYE: CPT | Performed by: EMERGENCY MEDICINE

## 2020-01-01 PROCEDURE — 93306 TTE W/DOPPLER COMPLETE: CPT | Performed by: INTERNAL MEDICINE

## 2020-01-01 PROCEDURE — 99231 SBSQ HOSP IP/OBS SF/LOW 25: CPT | Performed by: INTERNAL MEDICINE

## 2020-01-01 PROCEDURE — 99285 EMERGENCY DEPT VISIT HI MDM: CPT

## 2020-01-01 PROCEDURE — 93005 ELECTROCARDIOGRAM TRACING: CPT

## 2020-01-01 PROCEDURE — B41DYZZ FLUOROSCOPY OF AORTA AND BILATERAL LOWER EXTREMITY ARTERIES USING OTHER CONTRAST: ICD-10-PCS | Performed by: SURGERY

## 2020-01-01 PROCEDURE — 85027 COMPLETE CBC AUTOMATED: CPT | Performed by: EMERGENCY MEDICINE

## 2020-01-01 PROCEDURE — 99223 1ST HOSP IP/OBS HIGH 75: CPT | Performed by: NURSE PRACTITIONER

## 2020-01-01 PROCEDURE — 71045 X-RAY EXAM CHEST 1 VIEW: CPT | Performed by: EMERGENCY MEDICINE

## 2020-01-01 PROCEDURE — 047V3ZZ DILATION OF RIGHT FOOT ARTERY, PERCUTANEOUS APPROACH: ICD-10-PCS | Performed by: SURGERY

## 2020-01-01 PROCEDURE — 99232 SBSQ HOSP IP/OBS MODERATE 35: CPT | Performed by: INTERNAL MEDICINE

## 2020-01-01 PROCEDURE — 0HBRXZZ EXCISION OF TOE NAIL, EXTERNAL APPROACH: ICD-10-PCS | Performed by: PODIATRIST

## 2020-01-01 PROCEDURE — 99222 1ST HOSP IP/OBS MODERATE 55: CPT | Performed by: NURSE PRACTITIONER

## 2020-01-01 PROCEDURE — 76700 US EXAM ABDOM COMPLETE: CPT | Performed by: INTERNAL MEDICINE

## 2020-01-01 PROCEDURE — 99291 CRITICAL CARE FIRST HOUR: CPT

## 2020-01-01 PROCEDURE — 047P3ZZ DILATION OF RIGHT ANTERIOR TIBIAL ARTERY, PERCUTANEOUS APPROACH: ICD-10-PCS | Performed by: SURGERY

## 2020-01-01 PROCEDURE — 85025 COMPLETE CBC W/AUTO DIFF WBC: CPT | Performed by: EMERGENCY MEDICINE

## 2020-01-01 PROCEDURE — 99233 SBSQ HOSP IP/OBS HIGH 50: CPT | Performed by: NURSE PRACTITIONER

## 2020-01-01 PROCEDURE — 72141 MRI NECK SPINE W/O DYE: CPT | Performed by: INTERNAL MEDICINE

## 2020-01-01 PROCEDURE — 96360 HYDRATION IV INFUSION INIT: CPT

## 2020-01-01 PROCEDURE — 90935 HEMODIALYSIS ONE EVALUATION: CPT | Performed by: INTERNAL MEDICINE

## 2020-01-01 PROCEDURE — 71045 X-RAY EXAM CHEST 1 VIEW: CPT | Performed by: NURSE PRACTITIONER

## 2020-01-01 PROCEDURE — 80053 COMPREHEN METABOLIC PANEL: CPT | Performed by: EMERGENCY MEDICINE

## 2020-01-01 PROCEDURE — 73630 X-RAY EXAM OF FOOT: CPT | Performed by: PODIATRIST

## 2020-01-01 PROCEDURE — 99291 CRITICAL CARE FIRST HOUR: CPT | Performed by: NURSE PRACTITIONER

## 2020-01-01 PROCEDURE — 5A1D70Z PERFORMANCE OF URINARY FILTRATION, INTERMITTENT, LESS THAN 6 HOURS PER DAY: ICD-10-PCS | Performed by: INTERNAL MEDICINE

## 2020-01-01 PROCEDURE — 93880 EXTRACRANIAL BILAT STUDY: CPT | Performed by: OTHER

## 2020-01-01 PROCEDURE — 73218 MRI UPPER EXTREMITY W/O DYE: CPT | Performed by: OTHER

## 2020-01-01 PROCEDURE — 70551 MRI BRAIN STEM W/O DYE: CPT | Performed by: OTHER

## 2020-01-01 PROCEDURE — 99222 1ST HOSP IP/OBS MODERATE 55: CPT | Performed by: INTERNAL MEDICINE

## 2020-01-01 PROCEDURE — 96361 HYDRATE IV INFUSION ADD-ON: CPT

## 2020-01-01 PROCEDURE — 71045 X-RAY EXAM CHEST 1 VIEW: CPT | Performed by: INTERNAL MEDICINE

## 2020-01-01 PROCEDURE — 70450 CT HEAD/BRAIN W/O DYE: CPT | Performed by: INTERNAL MEDICINE

## 2020-01-01 PROCEDURE — 85007 BL SMEAR W/DIFF WBC COUNT: CPT | Performed by: EMERGENCY MEDICINE

## 2020-01-01 PROCEDURE — 93010 ELECTROCARDIOGRAM REPORT: CPT

## 2020-01-01 PROCEDURE — 99232 SBSQ HOSP IP/OBS MODERATE 35: CPT | Performed by: NURSE PRACTITIONER

## 2020-01-01 PROCEDURE — 047T3ZZ DILATION OF RIGHT PERONEAL ARTERY, PERCUTANEOUS APPROACH: ICD-10-PCS | Performed by: SURGERY

## 2020-01-01 PROCEDURE — 93971 EXTREMITY STUDY: CPT | Performed by: HOSPITALIST

## 2020-01-01 PROCEDURE — 73660 X-RAY EXAM OF TOE(S): CPT | Performed by: NURSE PRACTITIONER

## 2020-01-01 RX ORDER — LORAZEPAM 2 MG/ML
0.5 INJECTION INTRAMUSCULAR ONCE AS NEEDED
Status: ACTIVE | OUTPATIENT
Start: 2020-01-01 | End: 2020-01-01

## 2020-01-01 RX ORDER — CALCIUM CARBONATE 200(500)MG
500 TABLET,CHEWABLE ORAL DAILY PRN
Status: DISCONTINUED | OUTPATIENT
Start: 2020-01-01 | End: 2020-01-01

## 2020-01-01 RX ORDER — CLOPIDOGREL BISULFATE 75 MG/1
300 TABLET ORAL ONCE
Status: DISCONTINUED | OUTPATIENT
Start: 2020-01-01 | End: 2020-01-01 | Stop reason: ALTCHOICE

## 2020-01-01 RX ORDER — POTASSIUM CHLORIDE 20 MEQ/1
20 TABLET, EXTENDED RELEASE ORAL ONCE
Status: DISCONTINUED | OUTPATIENT
Start: 2020-01-01 | End: 2020-01-01

## 2020-01-01 RX ORDER — ONDANSETRON 2 MG/ML
4 INJECTION INTRAMUSCULAR; INTRAVENOUS EVERY 6 HOURS PRN
Status: DISCONTINUED | OUTPATIENT
Start: 2020-01-01 | End: 2020-01-01

## 2020-01-01 RX ORDER — TRAMADOL HYDROCHLORIDE 50 MG/1
50 TABLET ORAL EVERY 12 HOURS PRN
Status: DISCONTINUED | OUTPATIENT
Start: 2020-01-01 | End: 2020-01-01

## 2020-01-01 RX ORDER — ACETAMINOPHEN 650 MG/1
650 SUPPOSITORY RECTAL EVERY 4 HOURS PRN
Status: DISCONTINUED | OUTPATIENT
Start: 2020-01-01 | End: 2020-01-01

## 2020-01-01 RX ORDER — LORAZEPAM 0.5 MG/1
0.5 TABLET ORAL 4 TIMES DAILY PRN
Status: DISCONTINUED | OUTPATIENT
Start: 2020-01-01 | End: 2020-01-01

## 2020-01-01 RX ORDER — AMLODIPINE BESYLATE 5 MG/1
5 TABLET ORAL DAILY
Status: DISCONTINUED | OUTPATIENT
Start: 2020-01-01 | End: 2020-01-01

## 2020-01-01 RX ORDER — MIDODRINE HYDROCHLORIDE 10 MG/1
10 TABLET ORAL 3 TIMES DAILY
Status: DISCONTINUED | OUTPATIENT
Start: 2020-01-01 | End: 2020-01-01

## 2020-01-01 RX ORDER — HYDROCODONE BITARTRATE AND ACETAMINOPHEN 5; 325 MG/1; MG/1
1 TABLET ORAL EVERY 8 HOURS PRN
Qty: 20 TABLET | Refills: 0 | Status: SHIPPED | OUTPATIENT
Start: 2020-01-01

## 2020-01-01 RX ORDER — PANTOPRAZOLE SODIUM 20 MG/1
20 TABLET, DELAYED RELEASE ORAL
Status: DISCONTINUED | OUTPATIENT
Start: 2020-01-01 | End: 2020-01-01

## 2020-01-01 RX ORDER — SODIUM CHLORIDE 9 MG/ML
INJECTION, SOLUTION INTRAVENOUS CONTINUOUS
Status: ACTIVE | OUTPATIENT
Start: 2020-01-01 | End: 2020-01-01

## 2020-01-01 RX ORDER — ATORVASTATIN CALCIUM 80 MG/1
80 TABLET, FILM COATED ORAL NIGHTLY
Status: DISCONTINUED | OUTPATIENT
Start: 2020-01-01 | End: 2020-01-01

## 2020-01-01 RX ORDER — HYDROCODONE BITARTRATE AND ACETAMINOPHEN 5; 325 MG/1; MG/1
2 TABLET ORAL EVERY 6 HOURS PRN
Status: DISCONTINUED | OUTPATIENT
Start: 2020-01-01 | End: 2020-01-01

## 2020-01-01 RX ORDER — SODIUM CHLORIDE 9 MG/ML
INJECTION, SOLUTION INTRAVENOUS CONTINUOUS
Status: DISCONTINUED | OUTPATIENT
Start: 2020-01-01 | End: 2020-01-01

## 2020-01-01 RX ORDER — DOCUSATE SODIUM 100 MG/1
100 CAPSULE, LIQUID FILLED ORAL 2 TIMES DAILY
Status: DISCONTINUED | OUTPATIENT
Start: 2020-01-01 | End: 2020-01-01

## 2020-01-01 RX ORDER — HEPARIN SODIUM 5000 [USP'U]/ML
5000 INJECTION, SOLUTION INTRAVENOUS; SUBCUTANEOUS EVERY 8 HOURS SCHEDULED
Status: DISCONTINUED | OUTPATIENT
Start: 2020-01-01 | End: 2020-01-01

## 2020-01-01 RX ORDER — BISACODYL 10 MG
10 SUPPOSITORY, RECTAL RECTAL
Status: DISCONTINUED | OUTPATIENT
Start: 2020-01-01 | End: 2020-01-01

## 2020-01-01 RX ORDER — HYDROMORPHONE HYDROCHLORIDE 1 MG/ML
0.1 INJECTION, SOLUTION INTRAMUSCULAR; INTRAVENOUS; SUBCUTANEOUS EVERY 4 HOURS PRN
Status: DISCONTINUED | OUTPATIENT
Start: 2020-01-01 | End: 2020-01-01

## 2020-01-01 RX ORDER — HEPARIN SODIUM 5000 [USP'U]/ML
INJECTION, SOLUTION INTRAVENOUS; SUBCUTANEOUS
Status: COMPLETED
Start: 2020-01-01 | End: 2020-01-01

## 2020-01-01 RX ORDER — SODIUM CHLORIDE 9 MG/ML
500 INJECTION, SOLUTION INTRAVENOUS CONTINUOUS
Status: ACTIVE | OUTPATIENT
Start: 2020-01-01 | End: 2020-01-01

## 2020-01-01 RX ORDER — AMIODARONE HYDROCHLORIDE 200 MG/1
200 TABLET ORAL NIGHTLY
Status: DISCONTINUED | OUTPATIENT
Start: 2020-01-01 | End: 2020-01-01

## 2020-01-01 RX ORDER — HYDROCODONE BITARTRATE AND ACETAMINOPHEN 5; 325 MG/1; MG/1
1-2 TABLET ORAL EVERY 6 HOURS PRN
Status: DISCONTINUED | OUTPATIENT
Start: 2020-01-01 | End: 2020-01-01 | Stop reason: SDUPTHER

## 2020-01-01 RX ORDER — ASPIRIN 325 MG
325 TABLET ORAL DAILY
Status: DISCONTINUED | OUTPATIENT
Start: 2020-01-01 | End: 2020-01-01

## 2020-01-01 RX ORDER — HYDROCODONE BITARTRATE AND ACETAMINOPHEN 5; 325 MG/1; MG/1
1 TABLET ORAL EVERY 6 HOURS PRN
Status: DISCONTINUED | OUTPATIENT
Start: 2020-01-01 | End: 2020-01-01

## 2020-01-01 RX ORDER — METOCLOPRAMIDE HYDROCHLORIDE 5 MG/ML
10 INJECTION INTRAMUSCULAR; INTRAVENOUS EVERY 8 HOURS PRN
Status: DISCONTINUED | OUTPATIENT
Start: 2020-01-01 | End: 2020-01-01

## 2020-01-01 RX ORDER — ACETAMINOPHEN 500 MG
1000 TABLET ORAL ONCE
Status: COMPLETED | OUTPATIENT
Start: 2020-01-01 | End: 2020-01-01

## 2020-01-01 RX ORDER — LEVETIRACETAM 250 MG/1
250 TABLET ORAL 2 TIMES DAILY
Status: DISCONTINUED | OUTPATIENT
Start: 2020-01-01 | End: 2020-01-01

## 2020-01-01 RX ORDER — FOLIC ACID 1 MG/1
1 TABLET ORAL
Status: DISCONTINUED | OUTPATIENT
Start: 2020-01-01 | End: 2020-01-01

## 2020-01-01 RX ORDER — LIDOCAINE HYDROCHLORIDE 10 MG/ML
INJECTION, SOLUTION EPIDURAL; INFILTRATION; INTRACAUDAL; PERINEURAL
Status: DISCONTINUED
Start: 2020-01-01 | End: 2020-01-01 | Stop reason: WASHOUT

## 2020-01-01 RX ORDER — HEPARIN SODIUM 5000 [USP'U]/ML
5000 INJECTION, SOLUTION INTRAVENOUS; SUBCUTANEOUS EVERY 12 HOURS SCHEDULED
Status: DISCONTINUED | OUTPATIENT
Start: 2020-01-01 | End: 2020-01-01

## 2020-01-01 RX ORDER — LORAZEPAM 2 MG/ML
0.25 INJECTION INTRAMUSCULAR ONCE
Status: DISCONTINUED | OUTPATIENT
Start: 2020-01-01 | End: 2020-01-01

## 2020-01-01 RX ORDER — MIDAZOLAM HYDROCHLORIDE 1 MG/ML
INJECTION INTRAMUSCULAR; INTRAVENOUS
Status: COMPLETED
Start: 2020-01-01 | End: 2020-01-01

## 2020-01-01 RX ORDER — MORPHINE SULFATE 4 MG/ML
1 INJECTION, SOLUTION INTRAMUSCULAR; INTRAVENOUS EVERY 4 HOURS PRN
Status: DISCONTINUED | OUTPATIENT
Start: 2020-01-01 | End: 2020-01-01

## 2020-01-01 RX ORDER — ACETAMINOPHEN 500 MG
500 TABLET ORAL EVERY 6 HOURS PRN
Status: DISCONTINUED | OUTPATIENT
Start: 2020-01-01 | End: 2020-01-01

## 2020-01-01 RX ORDER — VANCOMYCIN HYDROCHLORIDE
25 ONCE
Status: COMPLETED | OUTPATIENT
Start: 2020-01-01 | End: 2020-01-01

## 2020-01-01 RX ORDER — ALBUMIN (HUMAN) 12.5 G/50ML
100 SOLUTION INTRAVENOUS AS NEEDED
Status: DISCONTINUED | OUTPATIENT
Start: 2020-01-01 | End: 2020-01-01

## 2020-01-01 RX ORDER — CLOPIDOGREL BISULFATE 75 MG/1
300 TABLET ORAL ONCE
Status: COMPLETED | OUTPATIENT
Start: 2020-01-01 | End: 2020-01-01

## 2020-01-01 RX ORDER — POLYETHYLENE GLYCOL 3350 17 G/17G
17 POWDER, FOR SOLUTION ORAL DAILY PRN
Status: DISCONTINUED | OUTPATIENT
Start: 2020-01-01 | End: 2020-01-01

## 2020-01-01 RX ORDER — LEVETIRACETAM 500 MG/1
500 TABLET ORAL 2 TIMES DAILY
Status: DISCONTINUED | OUTPATIENT
Start: 2020-01-01 | End: 2020-01-01

## 2020-01-01 RX ORDER — LEVETIRACETAM 250 MG/1
250 TABLET ORAL
Status: DISCONTINUED | OUTPATIENT
Start: 2020-01-01 | End: 2020-01-01

## 2020-01-01 RX ORDER — ACETAMINOPHEN 325 MG/1
650 TABLET ORAL EVERY 4 HOURS PRN
Status: DISCONTINUED | OUTPATIENT
Start: 2020-01-01 | End: 2020-01-01

## 2020-01-01 RX ORDER — METOCLOPRAMIDE HYDROCHLORIDE 5 MG/ML
5 INJECTION INTRAMUSCULAR; INTRAVENOUS EVERY 8 HOURS PRN
Status: DISCONTINUED | OUTPATIENT
Start: 2020-01-01 | End: 2020-01-01

## 2020-01-01 RX ORDER — LORAZEPAM 0.5 MG/1
0.5 TABLET ORAL 4 TIMES DAILY PRN
COMMUNITY

## 2020-01-01 RX ORDER — AMIODARONE HYDROCHLORIDE 200 MG/1
200 TABLET ORAL DAILY
Status: DISCONTINUED | OUTPATIENT
Start: 2020-01-01 | End: 2020-01-01

## 2020-01-01 RX ORDER — AMIODARONE HYDROCHLORIDE 200 MG/1
200 TABLET ORAL DAILY
COMMUNITY

## 2020-01-01 RX ORDER — ATORVASTATIN CALCIUM 80 MG/1
80 TABLET, FILM COATED ORAL NIGHTLY
Qty: 30 TABLET | Refills: 0 | Status: SHIPPED | COMMUNITY
Start: 2020-01-01 | End: 2020-01-01

## 2020-01-01 RX ORDER — LEVETIRACETAM 250 MG/1
250 TABLET ORAL 2 TIMES DAILY
Qty: 60 TABLET | Status: SHIPPED | COMMUNITY
Start: 2020-01-01

## 2020-01-01 RX ORDER — MIDODRINE HYDROCHLORIDE 5 MG/1
5 TABLET ORAL 3 TIMES DAILY
Status: DISCONTINUED | OUTPATIENT
Start: 2020-01-01 | End: 2020-01-01

## 2020-01-01 RX ORDER — CLOPIDOGREL BISULFATE 75 MG/1
75 TABLET ORAL DAILY
Qty: 90 TABLET | Refills: 0 | Status: SHIPPED | OUTPATIENT
Start: 2020-01-01 | End: 2020-04-16

## 2020-01-01 RX ORDER — DEXTROSE MONOHYDRATE 25 G/50ML
50 INJECTION, SOLUTION INTRAVENOUS
Status: DISCONTINUED | OUTPATIENT
Start: 2020-01-01 | End: 2020-01-01

## 2020-01-01 RX ORDER — MIDODRINE HYDROCHLORIDE 10 MG/1
10 TABLET ORAL 3 TIMES DAILY
Qty: 90 TABLET | Refills: 0 | Status: SHIPPED | OUTPATIENT
Start: 2020-01-01

## 2020-01-01 RX ORDER — RIVASTIGMINE 9.5 MG/24H
1 PATCH, EXTENDED RELEASE TRANSDERMAL DAILY
Status: DISCONTINUED | OUTPATIENT
Start: 2020-01-01 | End: 2020-01-01

## 2020-01-01 RX ORDER — ASPIRIN 300 MG
300 SUPPOSITORY, RECTAL RECTAL DAILY
Status: DISCONTINUED | OUTPATIENT
Start: 2020-01-01 | End: 2020-01-01

## 2020-01-01 RX ORDER — DOCUSATE SODIUM 100 MG/1
100 CAPSULE, LIQUID FILLED ORAL 2 TIMES DAILY PRN
Status: DISCONTINUED | OUTPATIENT
Start: 2020-01-01 | End: 2020-01-01

## 2020-01-01 RX ORDER — RIVASTIGMINE 9.5 MG/24H
1 PATCH, EXTENDED RELEASE TRANSDERMAL NIGHTLY
Status: DISCONTINUED | OUTPATIENT
Start: 2020-01-01 | End: 2020-01-01

## 2020-01-01 RX ORDER — ACETAMINOPHEN 160 MG
2000 TABLET,DISINTEGRATING ORAL DAILY
Status: DISCONTINUED | OUTPATIENT
Start: 2020-01-01 | End: 2020-01-01

## 2020-01-01 RX ORDER — INSULIN ASPART 100 [IU]/ML
0.1 INJECTION, SOLUTION INTRAVENOUS; SUBCUTANEOUS ONCE
Status: DISCONTINUED | OUTPATIENT
Start: 2020-01-01 | End: 2020-01-01

## 2020-01-01 RX ORDER — CLOPIDOGREL BISULFATE 75 MG/1
75 TABLET ORAL DAILY
Status: DISCONTINUED | OUTPATIENT
Start: 2020-01-01 | End: 2020-01-01

## 2020-01-01 RX ORDER — LIDOCAINE HYDROCHLORIDE 10 MG/ML
INJECTION, SOLUTION EPIDURAL; INFILTRATION; INTRACAUDAL; PERINEURAL
Status: COMPLETED
Start: 2020-01-01 | End: 2020-01-01

## 2020-01-01 RX ORDER — POTASSIUM CHLORIDE 1.5 G/1.77G
20 POWDER, FOR SOLUTION ORAL ONCE
Status: COMPLETED | OUTPATIENT
Start: 2020-01-01 | End: 2020-01-01

## 2020-01-01 RX ORDER — ASPIRIN 325 MG
325 TABLET ORAL DAILY
Qty: 30 TABLET | Refills: 0 | Status: SHIPPED | COMMUNITY
Start: 2020-01-01

## 2020-01-12 PROBLEM — G11.8 EPISODIC ATAXIA WITH SLURRED SPEECH (HCC): Status: ACTIVE | Noted: 2020-01-01

## 2020-01-12 PROBLEM — L03.031 CELLULITIS OF GREAT TOE, RIGHT: Status: ACTIVE | Noted: 2020-01-01

## 2020-01-12 PROBLEM — R47.81 EPISODIC ATAXIA WITH SLURRED SPEECH (HCC): Status: ACTIVE | Noted: 2020-01-01

## 2020-01-12 NOTE — ED NOTES
Patient is not taking ASA d/t hernia repair scheduled for Tuesday.  He is also having decrease in oral intake per daughter

## 2020-01-12 NOTE — ED INITIAL ASSESSMENT (HPI)
Patient arrives from home with family. C/O right sided facial droop and slurred speech that started yesterday morning. He also has c/o right foot swelling. Daughter states normally walks with walker and stand by assistance and now unable to ambulate.

## 2020-01-12 NOTE — ED PROVIDER NOTES
Patient Seen in: [facility] Emergency Department      History   Patient presents with:  Stroke    Stated Complaint: Slurred speech and droop started yesterday, MRI on Tues    HPI    17-year-old male presents emergency department who [facility] impairment     glasses              Past Surgical History:   Procedure Laterality Date   • COLECTOMY     • COLONOSCOPY N/A 5/2/2019    Performed by Debra Agustin MD at [facility] ENDOSCOPY   • ESOPHAGOGASTRODUODENOSCOPY (EGD) N/A 5/1/2019    Performed by Margarito Palomares good thrill in the left arm, there is a small sore on the top of the right great toe. Does have a little drainage from it. There is some surrounding erythema. Neuro: Cranial nerves II through XII intact with no gross focal sensory or motor abnormality. ------                     CBC W/ DIFFERENTIAL[[id number]]          Abnormal            Final result                 Please view results for these tests on the individual orders.    MAGNESIUM   LIPID PANEL   HEMOGLOBIN A1C   RAINBOW DRAW BLUE   RAINBOW D collection. NO hydrocephalus. Xr Chest Ap Portable  (cpt=71045)    Result Date: 1/12/2020  CONCLUSION:  No acute airspace disease. Subsegmental atelectasis at the right lung base.     Dictated by: Tanja Homans, MD on 1/12/2020 at 17:08     Approved by: (primary encounter diagnosis)  Hypotension, unspecified hypotension type  Episodic ataxia with slurred speech  Cellulitis of great toe, right    Disposition:  Admit  1/12/2020  6:26 pm    Follow-up:  No follow-up provider specified.       Medications Prescr

## 2020-01-13 NOTE — CARDIAC REHAB
Cardiac rehab received consult for stroke education.   Education completed with patient and caregiver present,

## 2020-01-13 NOTE — H&P
General Medicine H&P     Patient presents with:  Stroke       PCP: Kennedy Dsouza MD    History of Present Illness: Patient is a 70year old male with PMH including but not limited to SVT, dementia, DM, ESRD on HD, HL, HTN, seizure d/o who p/t [facility] ED c dialysis  Metoprolol Tartrate, 25 mg, 2x Daily(Beta Blocker)  atorvastatin, 80 mg, Nightly  aspirin, 300 mg, Daily    Or  aspirin, 325 mg, Daily  Heparin Sodium (Porcine), 5,000 Units, Q8H [address]  amiodarone HCl, 200 mg, Nightly  Vitamin D3, 2,000 Units, Syeda mass effect or midline shift. Dense calcifications within the basal ganglia bilaterally extending into the perivascular spaces. Similar appearing dense calcifications within the dentate nuclei of the cerebellum bilaterally.   Moderate atrophy and perivent with volume loss, stable. There are no extra-axial fluid collections. There is no midline shift. SINUSES/ORBITS:       The visualized paranasal sinuses demonstrate mucoperiosteal thickening involving the ethmoid sinuses. The orbits are unremarkable.  MAST bilateral basal nuclei. There is relative sparing of white matter disease in the occipital and temporal lobes. Midline shift: None. Cerebellum: Moderate-to-severe diffuse cerebellar volume loss, with mild T1 shortening in the dentate nuclei.  Brainstem: Mil STATED HISTORY: (As transcribed by Technologist)     FINDINGS:   IMAGE FINDINGS:  Noncalcified and calcified smooth and irregular plaque bilateral Right side stenosis 49% proximal ICA, 43% carotid bulb, 23% distal CCA Left side stenosis 44% proximal ICA, 4 yesterday morning. FINDINGS:  Subsegmental atelectasis at the right lung base. The left lung is clear. The heart is normal in size. There are vascular stents overlying the right chest.      CONCLUSION:  No acute airspace disease.   Subsegmental atelec

## 2020-01-13 NOTE — CM/SW NOTE
01/13/20 1400   CM/SW Referral Data   Referral Source Physician   Reason for Referral Psychoscial assessment; Discharge planning   Informant Children   Patient Info   Patient's Mental Status Alert;Memory Impairments   Patient's [address]   N

## 2020-01-13 NOTE — CONSULTS
[facility]  Report of Inpatient Wound Care Consultation     Itzel Khun.  Patient Status:  Observation    10/18/1940 MRN [id number]   [facility] 3NE-A 5914/7789-P Attending Kat Bolanos MD   Hosp Day # 0 PCP Gre Performed by Namrata Wilkinson MD at [facility] ENDOSCOPY   • OTHER SURGICAL HISTORY      placement of fistula for dialysis   • UPPER GI ENDOSCOPY,EXAM        Social History    Tobacco Use      Smoking status: Never Smoker      Smokeless tobacco: Never Used    Betsy Ibrahim (cm): 0.4    Hypergranulation: No    Tunneling:  No    Undermining:  No    Sinus Tract:  No     Wound Description:    Wound Encounter: Initial  Thickness: Unable to be determined  Exudate Amount: Scant  Exudate Type: Sero-sanguineous  Exudate Color: Red  O in the care of your patient. Please call me at the Inpatient Wound Care pager at #[phone] if you have any questions about this consultation and plan of care. Time Spent 45 Minutes.     Thank you,     Via Wander Ortega

## 2020-01-13 NOTE — ED NOTES
Meal tray ordered for patient who requested mashed potatoes w/ gravy and chicken noodle soup. Family updated on POC and awaiting admission bed assignment.  No needs voiced at this time, will continue to monitor

## 2020-01-13 NOTE — PROGRESS NOTES
[facility] Dosing Service  Antibiotic Dosing    Burak Hester. is a 70year old male for whom pharmacy is dosing Ancef for treatment of  cellulitis    Allergies: has No Known Allergies.     Vitals: /50 (BP Location: Left arm)   Pulse 71   T

## 2020-01-13 NOTE — DIETARY NOTE
[facility]    NUTRITION INITIAL ASSESSMENT    Pt does not meet malnutrition criteria.     NUTRITION DIAGNOSIS/PROBLEM:    Increased nutrient needs related to physiologic causes increasing nutrient needs as evidenced by ESRD on HD and diabetic foot ulce lb)  06/27/19 : 55.8 kg (123 lb)    NUTRITION:  Diet: Cardiac  Oral Supplements: Verrosalese Nguyễn at lunch    FOOD/NUTRITION RELATED HISTORY:  Appetite: Poor and Fair  Intake: No PO intakes recorded yet  Intake Meeting Needs: Marginal, added supplements  Food All

## 2020-01-13 NOTE — PLAN OF CARE
Patient alert and oriented x3. Hx dementia. On 1L, . NSR on tele. Pt complains of R toe pain. Wound care on. PRN tylenol. Podiatry consulted. Stroke protocol dc'd per neuro. Cardiology consulted. Plan for 2D echo.   BP parameters 120-145 systol

## 2020-01-13 NOTE — PROGRESS NOTES
Notified patient was in the hospital, patient is scheduled for elective hernia repair tomorrow. Discussed with patient and daughter Joana Collins re: rescheduling surgery.    Provided Joana Collins phone number of our surgery scheduler Bridgett Yanes to call once recovered

## 2020-01-13 NOTE — CONSULTS
Neurology consult NOTE    The reason for consultation:    Transient speech disturbance and right facial drop. HPI:   Pt was admitted to hospital  for right-sided facial droop and slurred speech and right facial droop.  He was seen by Dr. Mary kendall ENDOSCOPY,EXAM       Family History   Problem Relation Age of Onset   • Hypertension Father      Social History    Socioeconomic History      Marital status:        Spouse name: Not on file      Number of children: 4      Years of education: Not on f 3  aspirin 81 MG Oral Tab, Take 81 mg by mouth daily. , Disp: , Rfl:   docusate sodium 100 MG Oral Cap, Take 1 capsule (100 mg total) by mouth 2 (two) times daily. , Disp: 60 capsule, Rfl: 5  amLODIPine Besylate 5 MG Oral Tab, Take 1 tablet (5 mg total) by m tenderness     Extremities: right foot has gauze due to the wound infection. MENTAL STATUS:     Orientation: Orientated to person, place and not time. CRANIAL NERVES:     Visual fields /VA : There was no VF deficit on both eyes.       Pupils: pu ([id number])    Result Date: 1/12/2020  CONCLUSION:  1. No acute process. 2. Moderate diffuse atrophy and white matter disease consistent with chronic small vessel ischemic changes.  3. Stable diffuse physiologic calcifications of bilateral basal ganglia, bilate He dose has hypotension. Consider hyperperfusion induced symptoms. He dose has risk of CVA, on  mg daily. He is currently on Norvasc and Metoprolol for HTN. Consider the event of hypotension, it can be adjusted by cardiologist.     PLAN:     1.  Card

## 2020-01-13 NOTE — OCCUPATIONAL THERAPY NOTE
OCCUPATIONAL THERAPY EVALUATION - INPATIENT     Room Number: 9727/8854-V  Evaluation Date: 1/13/2020  Type of Evaluation: Initial  Presenting Problem: ESRD, AMS    Physician Order: IP Consult to Occupational Therapy  Reason for Therapy: ADL/IADL Dysfunctio disorder    • Renal failure (ARF), acute on chronic (HCC)    • Seizure disorder ([facility]    • [facility]   • TIA (transient ischemic attack) 2010   • Visual impairment     glasses       Past Surgical History  Past Surgical History:   Procedure Siddhartha Clinton motivated for therapy today.     RANGE OF MOTION AND STRENGTH ASSESSMENT  Upper extremity ROM is within functional limits     Upper extremity strength is within functional limits     COORDINATION  Gross Motor    WFL    Fine Motor    [facility]      ADDITIONAL TEST evaluation patient presents with the following performance deficits: endurance, safety, pain, activity tolerance, mobility, self-care.  These deficits impact the patient’s ability to participate in ADLs, instrumental activities of daily living, rest and sle supervision with bilateral AROM HEP (home exercise program).     Additional Goals:  Pt will verbalize at least 3 energy conservation techniques  Pt will stand at sink for 5 minutes to complete grooming routine

## 2020-01-13 NOTE — SLP NOTE
ADULT SWALLOWING EVALUATION    ASSESSMENT    ASSESSMENT/OVERALL IMPRESSION:  Pt was seen this AM during mealtime. Pt was alert, cooperative, and pleasant. Pt was admitted with c/o R facial droop and slurred speech. MRI and CT negative for acute findings.  P Compensatory Strategies Recommended: Small bites and sips; Alternate consistencies  Aspiration Precautions: Upright position; Slow rate;Small bites and sips  Medication Administration Recommendations: No restrictions  Treatment Plan/Recommendations: No fur MRI (1/12/20) CONCLUSION:    1. No acute infarct. 2. Findings most consistent with chronic small vessel ischemic change within the deep white matter. CXR (1/12/20)   =====  CONCLUSION:  No acute airspace disease.   Subsegmental atelectasis at the right thea

## 2020-01-13 NOTE — CONSULTS
[facility] Cardiology Consultation    Macey Larose.  Patient Status:  Observation    10/18/1940 MRN [id number]   [facility] 3NE-A Attending Dalton Cosby MD   Hosp Day # 0 PCP Daija Vargas MD     Reason for Consultation:  Lab (EGD) N/A 5/1/2019    Performed by Leslee Guzmán MD at [facility] ENDOSCOPY   • OTHER SURGICAL HISTORY      placement of fistula for dialysis   • UPPER GI ENDOSCOPY,EXAM       Family History   Problem Relation Age of Onset   • Hypertension Father          Allerg Regular S1S2. No S3, S4, rub, click. Mixed systolic /diastolic murmur at RUSB  Lungs: Clear to auscultation and percussion. Abdomen: Soft, non-tender. Extremities: No LE edema. No clubbing or cyanosis  Neurologic: Alert and oriented, normal affect.

## 2020-01-13 NOTE — CONSULTS
[facility] Pharmacy Note:  Renal Dose Adjustment for Metoclopramide (REGLAN)    Kaveh Sadia. has been prescribed Metoclopramide (REGLAN) 10 mg every 8 hours as needed for N/V.     Estimated Creatinine Clearance: 6.8 mL/min (A) (based on SCr of 7.35 mg/d

## 2020-01-13 NOTE — PHYSICAL THERAPY NOTE
PHYSICAL THERAPY EVALUATION - INPATIENT     Room Number: 5948/2756-M  Evaluation Date: 1/13/2020  Type of Evaluation: Initial  Physician Order: PT Eval and Treat    Presenting Problem: Hypotension; ESRD; R hallux pain and infection   Reason for Therapy (non-insulin dependent diabetes mellitus)    • Pneumonia due to organism 2018   • Renal disorder    • Renal failure (ARF), acute on chronic [facility])    • Seizure disorder ([facility]    • Stroke ([facility] 2009   • TIA (transient ischemic attack) 2010   • Visual impairm )  Management Techniques: Other (Comment)(surg shoe rec from wound care, rn to follow up)    COGNITION  · Overall Cognitive Status:  Impaired and dementia at baseline.    · Arousal/Alertness:  appropriate responses to stimuli  · Following Commands:  follows STATUS  Gait Assessment   Gait Assistance: Not tested(no surgical shoe here yet. )  Distance (ft): 0        Stoop/Curb Assistance: Not tested  Comment : n/a      Skilled Therapy Provided:     During eval and treat, pt on 3L of O2 with >92% saturation. EFREN.     Based on this evaluation, patient's clinical presentation is evolving and overall the evaluation complexity is considered moderate.     These impairments and comorbidities manifest themselves as functional limitations in independent bed mobility,

## 2020-01-14 NOTE — PROGRESS NOTES
[facility]  Progress Note    Allegra Shoulders.  Patient Status:  Observation    10/18/1940 MRN [id number]   [facility] 3NE-A Attending Amandeep August MD   Hosp Day # 0 PCP Dane Pennington MD     No acute events overnight Intake/Output Summary (Last 24 hours) at 1/14/2020 1003  Last data filed at 1/13/2020 1230  Gross per 24 hour   Intake 120 ml   Output —   Net 120 ml     Last 3 Weights  01/12/20 2040 : 129 lb 4.8 oz (58.7 kg)  01/12/20 1542 : 125 lb (56.7 kg)  12/05/1

## 2020-01-14 NOTE — PHYSICAL THERAPY NOTE
PHYSICAL THERAPY TREATMENT NOTE - INPATIENT    Room Number: 0711/3861-G     Session: 1     Number of Visits to Meet Established Goals: 5    Presenting Problem: Hypotension; ESRD; R hallux pain and infection     History related to current admission: Pt i • NIDDM (non-insulin dependent diabetes mellitus)    • Pneumonia due to organism 2018   • Renal disorder    • Renal failure (ARF), acute on chronic [facility])    • [facility]    • Stroke ([facility] 2009   • TIA (transient ischemic attack) 2010   • Visu (including a wheelchair)?: A Little   -   Need to walk in hospital room?: A Little   -   Climbing 3-5 steps with a railing?: Total       AM-PAC Score:  Raw Score: 17   Approx Degree of Impairment: 50.57%   Standardized Score (AM-PAC Scale): 42.13   CMS Mod skilled inpt pt to address the above problems and facilitate return to baseline. DISCHARGE RECOMMENDATIONS  PT Discharge Recommendations: Home with home health PT     PLAN  PT Treatment Plan: Bed mobility; Body mechanics; Endurance; Patient education; Fami

## 2020-01-14 NOTE — PLAN OF CARE
Assumed care at 0730. Pt a/ox3, able to tell me name, birthday, location, and why he was hospitalized. Pt with pain to right great toe, PRN medication per MAR. No c/o n/v/d, SOB, dizziness/lightheadedness. Seizure precautions in place.  IV SL. [facility], may be able to perform squat pivot t/f.     Outcome: Progressing     Problem: Impaired Activities of Daily Living  Goal: Achieve highest/safest level of independence in self care  Description  Interventions:  - Assess ability and e

## 2020-01-14 NOTE — PROGRESS NOTES
[address] Cardiology Progress Note        Nessa Paula.  Patient Status:  Observation    10/18/1940 MRN [id number]   [facility] 3NE-A Attending Norma Fuentes MD   Hosp Day # 0 PCP Savannah Nix MD bruits. Cardiac: Regular S1S2. No S3, S4, rub, click. 0-4/4 systolic   murmur. Lungs: Clear to auscultation and percussion. Abdomen: Soft, non-tender. Extremities: No LE edema. No clubbing or cyanosis.     Neurologic: Alert and oriented, normal affe regurgitation. Mean gradient (D): 5mm Hg. 5. Tricuspid valve: There was moderate regurgitation.           Impression:     1. Hx of HTN. On norvasc, metoprolol. Labile readings with some SBP's in 90's.  Intermittent neuro sxs: dysarthria, facial droop

## 2020-01-14 NOTE — CONSULTS
Patient seen at bedside today in consultation for a ulceration to his right great toe. Patient's family member at bedside. Patient has had an ulcer for quite some time. She follows with a podiatrist on a regular basis.   For a while it seemed to be imp 01/13/20  2350 01/14/20  0445 01/14/20  0754 01/14/20  0800   BP: 96/34 106/39  110/48   BP Location: Left arm Left arm     Pulse: 68 68  70   Resp: 18 18 18    Temp: 98.6 °F (37 °C) 98.1 °F (36.7 °C) 98.3 °F (36.8 °C)    TempSrc: Oral Oral Oral    Sp in-house.

## 2020-01-14 NOTE — CONSULTS
[facility]  Vascular Surgery Consultation    Faby Wolfe.  Patient Status:  Observation    10/18/1940 MRN [id number]   [facility] 3NE-A Attending Sammy Encarnacion MD   Hosp Day # 0 PCP Addis Villagran MD     Reason for • TIA (transient ischemic attack) 2010   • Visual impairment     glasses     Past Surgical History:   Procedure Laterality Date   • COLECTOMY     • COLONOSCOPY N/A 5/2/2019    Performed by Adilene Garcia MD at [facility] ENDOSCOPY   • ESOPHAGOGASTRODUODENOSCOPY 1 mg, Oral, Daily  •  levETIRAcetam (KEPPRA) tab 500 mg, 500 mg, Oral, BID  •  LORazepam (ATIVAN) tab 0.5 mg, 0.5 mg, Oral, QID PRN  •  Pantoprazole Sodium (PROTONIX) EC tab 20 mg, 20 mg, Oral, QAM AC  •  rivastigmine (EXELON) 9.5 MG/24HR patch 1 patch, 1 the right and left    Right first toe with ulcer    Laboratory Data:  Lab Results   Component Value Date    PGLU 114 01/13/2020       Impression and Plan:  Atherosclerosis with ulcer– based on physical exam I suspect that he has significant tibial disease

## 2020-01-14 NOTE — PLAN OF CARE
Problem: Patient/Family Goals  Goal: Patient/Family Long Term Goal  Description  Patient's Long Term Goal: discharge home    Interventions:  - administer meds as ordered  - monitor vitals,labs, intake and output  - See additional Care Plan goals for spec functional activity level and precautions during self-care     Outcome: Progressing

## 2020-01-14 NOTE — SLP NOTE
Orders received for speech evaluation- discussed case with RN given pt evaluated yesterday with no further follow-up indicated give baseline abilities reported at bedside by daughter and safety with diet.  RN reported no changes to medical status since eval

## 2020-01-15 NOTE — PROGRESS NOTES
Vascular Surgery Progress Note    I spoke to the patient. I informed him that due to availability issues, his procedure is postponed for today. The plan is for him to undergo an angiogram tomorrow with Dr. Yung Lange. Ok to eat today. NPO at midnight.  All ques

## 2020-01-15 NOTE — PROGRESS NOTES
Patient seen at bedside. Currently undergoing dialysis. Otherwise resting in no obvious distress. Afebrile. Dressing intact to great toe right.     Impression: PVD, ulceration right great toe    X-ray: Negative for obvious bone infection no gas in the

## 2020-01-15 NOTE — PROGRESS NOTES
Neurology follow up NOTE    SUBJECTIVE:  No new neurological events. He is stable. Family members were at bed side. No more TIA like symptoms. He is on dialysis now.      OBJECTIVE:   /31 (BP Location: Left arm)   Pulse 79   Temp 97.4 °F (36.3 °C) (Or Xr Foot, Complete (min 3 Views), Right (cpt=73630)    Result Date: 1/14/2020  CONCLUSION:  No evidence of acute displaced fracture or dislocation in the right foot. No significant radiographic evidence to suggest osteomyelitis.   If there is further cl intracranial hemorrhage, midline shift, mass effect or abnormal extra-axial fluid collection. NO hydrocephalus.     Us Carotid Doppler Bilat - Diag Img (cpt=93880)    Result Date: 1/13/2020  CONCLUSION:  Carotid stenosis less than 50%, by both Doppler, and

## 2020-01-15 NOTE — PLAN OF CARE
Patient is A&O x3, pt able to state year but not month correctly. Slightly agitated, prn ativan given per [facility]. R arm precautions, AVF.  VSS. On tele-NSR. On RA. IV-SL. IV abx. Denies any pain or discomfort. Aspiration precautions.    Wound care daily precautions during self-care     Outcome: Progressing

## 2020-01-15 NOTE — PROGRESS NOTES
[facility] Cardiology Progress Note        Ygnacio Dubin.  Patient Status:  Observation    10/18/1940 MRN [id number]   [facility] 3NE-A Attending Sophie Landeros MD   Hosp Day # 0 PCP Jose Mcleod MD murmur. Lungs: Clear to auscultation and percussion. Abdomen: Soft, non-tender. Extremities: No LE edema. No clubbing or cyanosis. Neurologic: Alert and oriented, normal affect. Skin: Warm and dry.            LABS:      HEM:  Recent Labs   Lab 01/ gradient (D): 5mm Hg. 5. Tricuspid valve: There was moderate regurgitation.           Impression:     1. Hx of HTN. On norvasc, metoprolol. Labile readings with some SBP's in 90's.  Intermittent neuro sxs: dysarthria, facial droop which may reflect cereb

## 2020-01-15 NOTE — PROGRESS NOTES
[facility] Hospitalist Progress Note     PCP: Gwen Tena MD    Chief Complaint: follow-up    Overnight/Interim Events:      SUBJECTIVE:  Laying in bed, on RA. Little better today. Speaking little better. Some toe pain    OBJECTIVE:  Temp:  [98.1 °F (36. sodium chloride Stopped (01/13/20 7017)     traMADol HCl, Albumin Human, ondansetron HCl **OR** Metoclopramide HCl, acetaminophen **OR** acetaminophen, Calcium Carbonate Antacid, docusate sodium, LORazepam       Assessment/Plan:        70year old male wit

## 2020-01-15 NOTE — CONSULTS
INFECTIOUS DISEASE CONSULT NOTE    Mabel Zuniga.  Patient Status:  Observation    10/18/1940 MRN [id number]   [facility] 3NE-A Attending Kimmy Campos MD   [facility] Day # 0 ischemic attack) 2010   • Visual impairment     glasses     Past Surgical History:   Procedure Laterality Date   • COLECTOMY     • COLONOSCOPY N/A 5/2/2019    Performed by Shannan Hall MD at [facility] ENDOSCOPY   • ESOPHAGOGASTRODUODENOSCOPY (EGD) N/A 5/1/2019 mg, Oral, QID PRN  •  Pantoprazole Sodium (PROTONIX) EC tab 20 mg, 20 mg, Oral, QAM AC  •  rivastigmine (EXELON) 9.5 MG/24HR patch 1 patch, 1 patch, Transdermal, Nightly  •  ceFAZolin (ANCEF) IVPB 1g/100ml in 0.9% NaCl minibag/add-van, 1 g, Intravenous, Q2 FINDINGS:  No evidence of acute displaced fracture or dislocation. Osteopenia. Wound dressing along the great toe. No radiographic evidence of osteomyelitis. Scattered vascular calcifications.        CONCLUSION:  No evidence of acute displaced fracture consistent with chronic small vessel ischemic changes. 3. Stable diffuse physiologic calcifications of bilateral basal ganglia, bilateral perivascular spaces and bilateral dentate nuclei. Unchanged from previous imaging. Dictated by:  Shaheed Hsu,  on Jessica Casiano MD on 1/13/2020 at 7:23          Mri Brain (cpt=70551)    Result Date: 1/7/2020  DATE OF SERVICE: 01.07.2020 CLINICAL INDICATION: 70years-old Male with  Weakness. End-stage renal disease. Seizures.  TECHNIQUE: Noncontrast multiplanar multisequence MR Negative. Upper cervical spine, sella and skull base: Negative. NO sellar or suprasellar masses seen. IMPRESSION: 1. Severe diffuse brain parenchyma volume loss, consistent with a neurodegenerative process.  2. Severe periventricular white matter disease Right ICA/CCA Velocity Ratio:  1.09                           Left CCA Peak Systolic Velocity:  70 centimeters/second             Left Proximal ICA Peak Systolic Velocity:  98.30 cm/s             Left Mid ICA Peak Systolic Velocity:  44.21 cm/s become apparent in xray)  - agree with recommendation from podiatry for partial toe amputation, abx alone are not likely to cure this infection  - cont ancef for now    2. PVD- for angiogram tomorrow    3.  ESRD on HD    Thank you for allowing me to partici

## 2020-01-15 NOTE — PLAN OF CARE
Assumed pt care @ 0730. Alert. Forgetful. Hx: dementia. VSS. PO keppra. Seizure precautions in place. No seizure activity noted. On room air. NSR on telemetry. Tolerating cardiac diet. HD MWF. Currently in progress. Pain right great toe. PRN given.  See [facility]

## 2020-01-15 NOTE — PROGRESS NOTES
[facility]  Nephrology Progress Note    Jeyson Wong.  Patient Status:  Observation    10/18/1940 MRN [id number]   [facility] 3NE-A Attending Margorie Essex, MD   Hosp Day # 0 PCP Nathalie Castillo MD       SUBJECTIVE: 10,000 Units, 10,000 Units, Intravenous, Once in dialysis  traMADol HCl (ULTRAM) tab 50 mg, 50 mg, Oral, Q12H PRN  Albumin Human (ALBUMINAR) 25 % solution 100 mL, 100 mL, Intravenous, PRN  Metoclopramide HCl (REGLAN) injection 5 mg, 5 mg, Intravenous, Q8H

## 2020-01-15 NOTE — PROGRESS NOTES
[facility] Hospitalist Progress Note     PCP: Tito Carter MD    Chief Complaint: follow-up    Overnight/Interim Events:      SUBJECTIVE:  Pt seen and examined. Doing better, family reports slurred speech has resolved. He does c/o R toe pain.   No chest Pantoprazole Sodium  20 mg Oral QAM AC   • rivastigmine  1 patch Transdermal Nightly   • ceFAZolin  1 g Intravenous Q24H       traMADol HCl, Albumin Human, ondansetron HCl **OR** Metoclopramide HCl, acetaminophen **OR** acetaminophen, Calcium Carbonate Ant

## 2020-01-16 NOTE — PHYSICAL THERAPY NOTE
PHYSICAL THERAPY TREATMENT NOTE - INPATIENT    Room Number: 8645/2984-E     Session: 2    Number of Visits to Meet Established Goals: 5    Presenting Problem: Hypotension; ESRD; R hallux pain and infection     History related to current admission: Pt is • NIDDM (non-insulin dependent diabetes mellitus)    • Pneumonia due to organism 2018   • Renal disorder    • Renal failure (ARF), acute on chronic [facility])    • [facility]    • Stroke ([facility] 2009   • TIA (transient ischemic attack) 2010   • Visu Climbing 3-5 steps with a railing?: Total       AM-PAC Score:  Raw Score: 8   Approx Degree of Impairment: 86.62%   Standardized Score (AM-PAC Scale): 28.58   CMS Modifier (G-Code): CM    FUNCTIONAL ABILITY STATUS  Gait Assessment   Gait Assistance: Not te mentioned impairments, patient would continue to benefit from skilled IP PT. Subacute rehab is recommended for 13-15 days. After this period of rehabilitation patient should achieve MIN level in transfers and short distance ambulation.     DISCHARGE RECOMME

## 2020-01-16 NOTE — PROGRESS NOTES
INFECTIOUS DISEASE PROGRESS NOTE    Ana Maria Anderson.  Patient Status:  Observation    10/18/1940 MRN [id number]   [facility] 3NE-A Attending Toni Norton MD   [address] Day # pertinent positives and negatives above    Physical Exam:    General: No acute distress. Alert, seemed appropriate  Vital signs: Blood pressure 117/43, pulse 74, temperature 98.3 °F (36.8 °C), temperature source Oral, resp.  rate 16, weight 129 lb 4.8 oz (5

## 2020-01-16 NOTE — BRIEF OP NOTE
Pre-Operative Diagnosis: Atherosclerosis with ulcer right first toe     Post-Operative Diagnosis: Name      Procedure Performed:   1. Ultrasound-guided percutaneous access left common femoral artery  2. Aortogram  3.   Selection of right common femoral ar

## 2020-01-16 NOTE — PROGRESS NOTES
01/16/20 1125   Clinical Encounter Type   Visited With Patient and family together   Referral To Nurse;Physician  (Patient's guardian signed POLST form.   Please page a  to file physician-signed POLST form into patient's electronic resuscitation

## 2020-01-16 NOTE — PLAN OF CARE
Assumed patient care at 1900  Patient A&O x 2-3 overnight  Forgetful and confused at times- better with re-orientation  Complaints of pain to (R) great toe- PRN pain medication given with relief noted  Seizure precautions in place for Hx of seizure  Ativan mobility and gait  - Educate and engage patient/family in tolerated activity level and precautions  Recommend use of RW and min assist for transfers and for ambulation of 12ft with chair follow, TID.    Outcome: Progressing     Problem: Impaired Activities

## 2020-01-16 NOTE — PROGRESS NOTES
[facility] Hospitalist Progress Note     PCP: Frank Ellington MD    Chief Complaint: follow-up    Overnight/Interim Events:      SUBJECTIVE:  Pt seen and examined.   Back from RLE angiogram with balloon angioplasty of R peroneal artery and R anterior tibial a Daily   • folic acid  1 mg Oral Daily   • levETIRAcetam  500 mg Oral BID   • Pantoprazole Sodium  20 mg Oral QAM AC   • rivastigmine  1 patch Transdermal Nightly   • ceFAZolin  1 g Intravenous Q24H       traMADol HCl, Albumin Human, ondansetron HCl **OR**

## 2020-01-16 NOTE — PROGRESS NOTES
[facility]  Nephrology Progress Note    Burak Hester.  Patient Status:  Observation    10/18/1940 MRN [id number]   [facility] 3NE-A Attending Diogo Love MD   Hosp Day # 0 PCP Mercedes Sicard, MD       SUBJECTIVE: PRN  Albumin Human (ALBUMINAR) 25 % solution 100 mL, 100 mL, Intravenous, PRN  Metoclopramide HCl (REGLAN) injection 5 mg, 5 mg, Intravenous, Q8H PRN    Or  ondansetron HCl (ZOFRAN) injection 4 mg, 4 mg, Intravenous, Q6H PRN  acetaminophen (TYLENOL) tab 65

## 2020-01-16 NOTE — WOUND PROGRESS NOTE
[facility]  Inpatient Wound Care Contact Note    Bernardo Chaudhary.  Patient Status:  Observation    10/18/1940 MRN [id number]   [facility] 3NE-A Attending Cesario Montelongo MD   Hosp Day # 0 PCP Frank Ellington MD     [facility]

## 2020-01-16 NOTE — PLAN OF CARE
Assumed care of patient at [address] [place] [address]. Patient A&Ox3, on RA, Heart Block on telemetry. Refused SCD's, painful for patient. Tylenol given for pain. Awaiting angiogram today; will change daily dressing to R toe following. PRN Tramadol for pain also.   Up x 1 ass Functional Mobility  Goal: Achieve highest/safest level of mobility/gait  Description  Interventions:  - Assess patient's functional ability and stability  - Promote increasing activity/tolerance for mobility and gait  - Educate and engage patient/family i Progressing  1/16/2020 1239 by Harvye Earl RN  Outcome: Progressing

## 2020-01-17 NOTE — PROCEDURES
[facility]    PATIENT'S NAME: Lani Pascual   ATTENDING PHYSICIAN: Ivan Agustin M.D. OPERATING PHYSICIAN: Maikol Polanco M.D.    PATIENT ACCOUNT#:   [de-identified]    LOCATION:  [address] [place] [address]  MEDICAL RECORD #:   [id number] filling of the dorsalis pedis artery. 5.   Posterior tibial artery occludes shortly after its origin with reconstitution of the medial plantar artery. SPECIMEN:  None. ESTIMATED BLOOD LOSS:  20 mL.     BRIEF HISTORY:  This is a 74-year-old renal trent used a 2.5 balloon and balloon angioplastied the peroneal and then balloon angioplastied the origin of it with a 3 mm balloon.   Repeat angiogram showed an excellent technical result with the peroneal artery patent and filling multiple collaterals on the fo Perclose device.     Dictated By Pooja Laughlin M.D.  d: 01/16/2020 16:09:33  t: 01/16/2020 16:42:00  [facility] [id number]  MJG/

## 2020-01-17 NOTE — PLAN OF CARE
Assumed care. S/p angiogram with plasty. Patient left groin site soft, no hematoma. Pulses by doppler. Patient refused dressing change last night. Daughter at bedside and updated with plan of care. Patient to have HD today. Will continue to monitor.

## 2020-01-17 NOTE — PROGRESS NOTES
No complaints  Foot warm with palpable DP pulse    Reasonable to attempt local wound care and antibotics  Continue plavix  Can be discharged when appropriate

## 2020-01-17 NOTE — PROGRESS NOTES
[facility] Hospitalist Progress Note     PCP: Jose Mcleod MD    Chief Complaint: follow-up    Overnight/Interim Events:      SUBJECTIVE:  Pt seen and examined. Currently receiving HD. States R toe pain controlled with tramadol. No F/C.      OBJECTIVE: atorvastatin  80 mg Oral Nightly   • aspirin  300 mg Rectal Daily    Or   • aspirin  325 mg Oral Daily   • Heparin Sodium (Porcine)  5,000 Units Subcutaneous Q8H [facility] intermediate   • amiodarone HCl  200 mg Oral Nightly   • Vitamin D3  2,000 Units Oral Daily   • folic a time discussing acute medical issues and discharge planning.       Basim Johnson DO  [facility] Hospitalist  [phone]

## 2020-01-17 NOTE — PHYSICAL THERAPY NOTE
PHYSICAL THERAPY EVALUATION - INPATIENT     Room Number: 7431  Evaluation Date: 1/13/2020  Type of Evaluation: Re-evaluation  Physician Order: PT Eval and Treat    Presenting Problem: Hypotension; ESRD; R hallux pain and infection   Reason for Therapy: blood transfusions this year in 2019   • HTN    • Hyperlipemia    • Muscle weakness     both legs   • Neuropathy     feet    • NIDDM (non-insulin dependent diabetes mellitus)    • Pneumonia due to organism 2018   • Renal disorder    • Renal failure (ARF), weekday.  He is confused to situation-impaired memory noted    [facility]    Lower extremity ROM is within functional limits     Lower extremity strength is within functional limits except for the following:    Right Hip flexion Assisted pt to EOB with max A x 2. Pt performed sit to stand a second time with max A x 2, still unable to attain full standing despite verbal and manual cues.  Pt grimacing in pain and was unable to bear weight through RLE despite encouragement, he also ap maximum assistance     Goal #2 Patient is able to demonstrate transfers EOB to/from Chair/Wheelchair at assistance level: maximum assistance     Goal #3 Pt will sit at EOB x 15 minutes with CGA to participate in there ex.       Goal #4    Goal #5    Goal #6

## 2020-01-17 NOTE — PROGRESS NOTES
Patient seen at bedside. Post angio. Relates mild pain to great toe. Afebrile    Ulcer to great toe stable. Light bleeding. No purulence. Foot warmer to touch. Dorsalis pulse noted.     Impression: ucler great toe                    pvd

## 2020-01-17 NOTE — PROGRESS NOTES
[facility] Dosing Service    Initial Pharmacokinetic Consult for Vancomycin Dosing     Nadiya Parish. is a 70year old male who is being treated for osteomyelitis.   Pharmacy has been asked to dose Vancomycin by Dr. Arielle Stephens    He has No Known Aller

## 2020-01-17 NOTE — DIETARY NOTE
[facility]    NUTRITION ASSESSMENT    Pt does not meet malnutrition criteria.     NUTRITION DIAGNOSIS/PROBLEM:    Increased nutrient needs related to physiologic causes increasing nutrient needs as evidenced by ESRD on HD and diabetic foot ulcer - ongo 57 kg (125 lb 10.6 oz)  09/10/19 : 57 kg (125 lb 10.6 oz)  09/03/19 : 57.2 kg (126 lb)  08/06/19 : 56.7 kg (125 lb)  06/27/19 : 55.8 kg (123 lb)  06/11/19 : 56.7 kg (125 lb)  06/11/19 : 57 kg (125 lb 10.6 oz)    NUTRITION:  Diet: Cardiac  Oral Supplements:

## 2020-01-17 NOTE — CM/SW NOTE
PT recommending EM. Spoke with pt's dtr Sera Hernandez about [facility] and pt think pt is better off going home with [facility] with whom they are current.   She says pt is very particular about his HD since he has been on HD for 24 years and would not like getting

## 2020-01-17 NOTE — PROGRESS NOTES
[facility]  Nephrology Progress Note    Bernardo Robersoner.  Patient Status:  Observation    10/18/1940 MRN [id number]   [facility] 3NE-A Attending Cesario Montelongo MD   Hosp Day # 1 PCP Frank Ellington MD       SUBJECTIVE: marquise-epbx (RETACRIT) 92298 UNIT/ML injection 10,000 Units, 10,000 Units, Intravenous, Once in dialysis  Clopidogrel Bisulfate (PLAVIX) tab 75 mg, 75 mg, Oral, Daily  Clopidogrel Bisulfate (PLAVIX) tab 300 mg, 300 mg, Oral, Once  traMADol HCl (ULTRAM) tab 5 MD  1/17/2020  8:43 AM

## 2020-01-17 NOTE — PROGRESS NOTES
[facility] Cardiology Progress Note        Gloria Vann.  Patient Status:  Observation    10/18/1940 MRN [id number]   [facility] 3NE-A Attending Lauren Sal MD   Hosp Day # 1 PCP Tito Carter MD deficits. Neck: No JVD, carotids 2+ no bruits. Cardiac: Regular S1S2. No S3, S4, rub, click. 0-0/6 systolic   murmur. Lungs: Clear to auscultation and percussion. Abdomen: Soft, non-tender. Extremities: No LE edema. No clubbing or cyanosis.     Paul (Vmax): 1.25cm^2.  4. Mitral valve: Moderately calcified annulus. There was mild regurgitation. Mean gradient (D): 5mm Hg. 5. Tricuspid valve: There was moderate regurgitation.           Impression:     1. Hx of HTN. On norvasc, metoprolol.   Labile r

## 2020-01-17 NOTE — PROGRESS NOTES
INFECTIOUS DISEASE PROGRESS NOTE    Shalini Pinzon.  Patient Status:  Observation    10/18/1940 MRN [id number]   [facility] 3NE-A Attending Ha Roman MD   [facility] Day # Oral, QAM AC  •  rivastigmine (EXELON) 9.5 MG/24HR patch 1 patch, 1 patch, Transdermal, Nightly  •  ceFAZolin (ANCEF) IVPB 1g/100ml in 0.9% NaCl minibag/add-van, 1 g, Intravenous, Q24H    Review of Systems:    Attempted but limited as pt not a very good hi amputation    2. PVD- s/p angiogram with good results    3. ESRD on HD    Case and plan d/w pt , no family at the bedside today  Will follow up with me in in the office in 2 weeks. Should have a weekly cbc while on IV abx.     Jayant Dutta

## 2020-01-17 NOTE — PLAN OF CARE
Problem: Patient/Family Goals  Goal: Patient/Family Long Term Goal  Description  Patient's Long Term Goal: To have right great toe wound heal    Interventions:  - Vascular and podiatry consult  - Angiogram  - Wound care  - Antibiotics  - See additional C Progressing     Problem: MUSCULOSKELETAL - ADULT  Goal: Return mobility to safest level of function  Description  INTERVENTIONS:  - Assess patient stability and activity tolerance for standing, transferring and ambulating w/ or w/o assistive devices  - Ass

## 2020-01-18 NOTE — PROGRESS NOTES
ADDENDUM:  The patient was personally seen and examined by me. I agree with the note written below with the following comments:    S: no acute events overnight. This AM, RUE weakness has recurred.       O: /43 (BP Location: Left arm)   Pulse 88   Tem Av.3 °F (36.8 °C), Min:97.6 °F (36.4 °C), Max:98.9 °F (37.2 °C)      Intake/Output Summary (Last 24 hours) at 2020 0928  Last data filed at 2020 1904  Gross per 24 hour   Intake 350 ml   Output 1.6 ml   Net 348.4 ml     Wt Readings from Last injection 5,000 Units, 5,000 Units, Subcutaneous, Q8H [address]  amiodarone HCl (PACERONE) tab 200 mg, 200 mg, Oral, Nightly  Calcium Carbonate Antacid (TUMS) chewable tab 500 mg, 500 mg, Oral, Daily PRN  Vitamin D3 cap 2,000 Units, 2,000 Units, Oral, Daily  fol

## 2020-01-18 NOTE — PROGRESS NOTES
01/18/20 1725   Clinical Encounter Type   Visited With Family  ( scanned completed POLST form, dated 1/17/20, into patient's electronic Resuscitation Wishes/POLST medical record. )

## 2020-01-18 NOTE — PROGRESS NOTES
[facility]  Nephrology Progress Note    Álvaro Gonzalez.  Patient Status:  Observation    10/18/1940 MRN [id number]   [facility] 3NE-A Attending Osiris Ford MD   Hosp Day # 2 PCP Fer Blackwell MD       SUBJECTIVE: 01/13/20  1214 01/13/20  1802   PGLU 171* 110* 186* 114*       Meds:   Midodrine HCl (PROAMATINE) tab 5 mg, 5 mg, Oral, TID  cefTAZidime (FORTAZ) 2 g in sodium chloride 0.9% 100 mL IVPB, 2 g, Intravenous, Once per day on Fri    And  [START ON 1/20/2020] ce Nightly          Impression/Plan:    #1. ESRD- due to DM/HTN. HD today per usual routine     #2. Anemia- due to ESRD. ESAs for goal hgb 10-11 gms     #3. R great toe infection- on ancef. Multispecialty care with podiatry/vascular/ID following.   S/p ang

## 2020-01-18 NOTE — PLAN OF CARE
Assumed care @ 0700. Pt a/o x3, VSS. Noted Rt arm weakness and tiredness this AM.  Dr. Angy Lo and Dr. Preethi Hathaway notified. MRI brain ordered by Dr. Preethi Hathaway - neg stroke, no changes. BP 80-90's today. TIA from low perfusion per Dr. Preethi Hathaway.   Neuro check q4 highest/safest level of mobility/gait  Description  Interventions:  - Assess patient's functional ability and stability  - Promote increasing activity/tolerance for mobility and gait  - Educate and engage patient/family in tolerated activity level and prec

## 2020-01-18 NOTE — PROGRESS NOTES
Assumed pt care @ 2300. Pt in bed, no acute distress, daughter sleeping at bedside. No complaints made. Sz precaution maintained. Right toe dressing clean dry and intact.

## 2020-01-18 NOTE — PLAN OF CARE
Assumed care of pt at 07:00 am  A/O x 2-3, forgetful at times. PRN Tramadol given, states relief of Rt great toe pain. Seizure precautions in place. HD completed with 1.6 L removed. Pt unable to ambulate with PT and staff.   Rt toe dressing changed pe tolerated activity level and precautions  - Recommend use of total lift for transfers    Outcome: Progressing     Problem: Impaired Activities of Daily Living  Goal: Achieve highest/safest level of independence in self care  Description  Interventions:  -

## 2020-01-18 NOTE — PROGRESS NOTES
[facility] Hospitalist Progress Note     PCP: Valentino Coons, MD    Chief Complaint: follow-up    Overnight/Interim Events:      SUBJECTIVE:  Daughter at bedside.   Reports that since he woke this am, leaning to R side, subtle R facial droop, and R arm weakne Daily   • Clopidogrel Bisulfate  300 mg Oral Once   • atorvastatin  80 mg Oral Nightly   • aspirin  300 mg Rectal Daily    Or   • aspirin  325 mg Oral Daily   • Heparin Sodium (Porcine)  5,000 Units Subcutaneous Q8H [facility]   • amiodarone HCl  200 mg Oral Nigh care d/w pt.       Zulma Sargent  [facility]  [phone]

## 2020-01-18 NOTE — PROGRESS NOTES
Neurology follow up NOTE    SUBJECTIVE:  He had event of right arm weakness and right facial droop this am, brain MRI with DWI showed no acute infarct, his symptom resolved. The event was the same as he had on the admission.  Of note, his BP was also low wh lt   CEREBELLUM:     Upper Extremities: FTN slow.      Lower Extremities: not be able to perform.      GAIT & STATION:     Gait: deferred    SPEECH:     Articulation: soft      Rhythm: slow      REFLEXES:    absent   SPEECH:     Articulation: soft      Rhy diffusion is seen to suggest acute ischemia. 3. Intrinsic T1 shortening seen in the cerebellar dentate nuclei and basal nuclei, which could represent sequela from underlying hepatorenal disease and manganese deposition. Correlate with liver function tests. per cardiology. Neuro F/U as needed.

## 2020-01-19 NOTE — PROGRESS NOTES
Neurology follow up NOTE    SUBJECTIVE:  Right arm weakness fluctuating, now can lift but not above the shoulder. Having some swallowing difficulties, per daughter it was started after his angiogram. He c/o right shoulder pain at times.  Family members were Rhythm: slow      REFLEXES:    absent   SPEECH:     Articulation: soft      Rhythm: slow. ASSESSMENT:      69 y/o presented with right arm and facial weakness. Resolved. The event was similar as he came in. TIA vs atypical migraine.  Favored TIA due to

## 2020-01-19 NOTE — PLAN OF CARE
Repeat swallow ordered and completed. Pt had rapid response called for potential seizure. Pt verbal, following commands and doing well. No s/s of aspiration with all consistencies assessed. Recommend to continue regular and thin liquids as tolerated.  Speec

## 2020-01-19 NOTE — PROGRESS NOTES
Came to rapid response for unresponsiveness and inability to get BP. As soon as I came in RN able to get a BP: 102/38. He suddenly opened eyes as soon as I touched him and he is able to answer all questions, follow all commands, move all extremities.   St

## 2020-01-19 NOTE — PROGRESS NOTES
ADDENDUM:  The patient was personally seen and examined by me. I agree with the note written below with the following comments:    S: better this AM, more alert/interactive during rounds. rapid response for unresonsiveness, unable to get BP.   Sudeenly resp Midodrine 5mg TID by Renal yesterday  5. MRI yest for transient R arm weakness yesterday w/o any acute findings. ?related to low BP. 6.  D/C in next day or two    Subjective:  Pt lethargic, in bed, but wants to get up to chair.       Objective:   Intravenous, Q6H PRN  acetaminophen (TYLENOL) tab 650 mg, 650 mg, Oral, Q4H PRN    Or  acetaminophen (TYLENOL) 650 MG rectal suppository 650 mg, 650 mg, Rectal, Q4H PRN  atorvastatin (LIPITOR) tab 80 mg, 80 mg, Oral, Nightly  aspirin 300 MG rectal supposit

## 2020-01-19 NOTE — SLP NOTE
ADULT SWALLOWING EVALUATION    ASSESSMENT    ASSESSMENT/OVERALL IMPRESSION:  Pt seen for repeat swallow evaluation following a decline in status per the pt's RN as pt is having difficulty swallowing pills.  The pt was seen and discharged from speech service Arrhythmia 07/2018    SVT   • AVM (arteriovenous malformation) of small bowel, acquired 2019    [facility] and had jejunal AVM's clipped. Anticoagulation has been stopped.    • Dementia [facility])    • Diabetes [facility])    • Dialysis patient [facility])     Ellyn Carrillo solid;Puree  Method of Presentation: Staff/Clinician assistance;Spoon;Cup;Single sips  Patient Positioning: Upright    Oral Phase of Swallow: Within Functional Limits                      Pharyngeal Phase of Swallow:  Within Functional Limits           (Ple

## 2020-01-19 NOTE — PROGRESS NOTES
[facility] Hospitalist Progress Note     PCP: Mercedes Sicard, MD    Chief Complaint: follow-up    Overnight/Interim Events:      SUBJECTIVE:    New neuro sx resolved. BP remain overall low.       OBJECTIVE:  Temp:  [98.2 °F (36.8 °C)-99 °F (37.2 °C)] 99 °F ( Daily    Or   • aspirin  325 mg Oral Daily   • Heparin Sodium (Porcine)  5,000 Units Subcutaneous Q8H [facility]   • amiodarone HCl  200 mg Oral Nightly   • Vitamin D3  2,000 Units Oral Daily   • folic acid  1 mg Oral Daily   • levETIRAcetam  500 mg Oral BID   •

## 2020-01-19 NOTE — PROGRESS NOTES
Pt found unresponsive in bed around 12:10 PM, when PCT walked into the room for vitals. Last time pt was normal was around 12:00 PM per family.   Unable to do neuro exam.  Noted some arm jerky movement when this RN attempted neuro exam.  Unable to read blo

## 2020-01-20 NOTE — WOUND PROGRESS NOTE
[facility]  Inpatient Wound Care Contact Note    Pepe Leonard.  Patient Status:  Inpatient    10/18/1940 MRN [id number]   [facility] 7NE-A Attending Ander Metz MD   [facility] Day # 4 PCP Blayne Zuniga MD     Attempt

## 2020-01-20 NOTE — PROGRESS NOTES
[place] [address] [place] [address] Cardiology Progress Note        Kaveh Mccullough.  Patient Status:  Observation    10/18/1940 MRN [id number]   [facility] 3NE-A Attending Gianni Moran MD   [facility] Day # 4 PCP Nadya Shah MD ()/(38-56) 122/44      Temp: 99.7 °F (37.6 °C)  Pulse: 82  Resp: 16  BP: 122/44  General:  Appears comfortable  HEENT: No focal deficits. Neck: No JVD, carotids 2+ no bruits. Cardiac: Regular S1S2. No S3, S4, rub, click. 4-7/8 systolic   murmur. separation was reduced. There was moderate stenosis. Trivial regurgitation. Peak velocity (S):     331cm/sec. Mean gradient (S): 19mm Hg. Peak gradient (S): 44mm Hg. Valve     area (Vmax): 1.25cm^2.  4. Mitral valve: Moderately calcified annulus.  There

## 2020-01-20 NOTE — PROGRESS NOTES
[facility]  Neurology  [facility] Progress Report    Faby Wolfe.  Patient Status:  Inpatient    10/18/1940 MRN [id number]   [facility] 7NE-A Attending Henok Whitman MD   [address] Day # 4 PCP Addis Villagran MD   Da COLONOSCOPY N/A 5/2/2019    Performed by Andreas James MD at [facility] ENDOSCOPY   • ESOPHAGOGASTRODUODENOSCOPY (EGD) N/A 5/1/2019    Performed by Andreas James MD at [facility] ENDOSCOPY   • OTHER SURGICAL HISTORY      placement of fistula for dialysis   • UPPER GI Oral, Nightly  aspirin 300 MG rectal suppository 300 mg, 300 mg, Rectal, Daily    Or  aspirin tab 325 mg, 325 mg, Oral, Daily  Heparin Sodium (Porcine) 5000 UNIT/ML injection 5,000 Units, 5,000 Units, Subcutaneous, Q8H [address]  amiodarone HCl (PACERONE) tab 20 99.7 °F (37.6 °C) (Oral)   Resp 16   Wt 127 lb (57.6 kg)   SpO2 99%   BMI 22.50 kg/m²    General: The patient appears attentive, no signs of distress  Head: Normocephalic, atraumatic. Eyes: anicteric  ENT: normal tongue, normal mucosa.    Neck: supple  Lym osteomyelitis, an MRI of the right foot may be of further value. Dictated by: Deric Peoples MD on 1/14/2020 at 11:01     Approved by: Deric Peoples MD on 1/14/2020 at 11:04          Ct Brain Or Head ([id number])    Result Date: 1/12/2020  CONCLUSION:  1. measurements. Doppler velocities fall within the less than 50% stenosis range. Maximum measured stenosis with grayscale ultrasound for the right side is 49% proximal ICA, and for the left side is 49% carotid bulb.       Dictated by: Juan Jose Chance MD on 1/ underlying medical problems. The prognosis here is poor. The patient can recover to his underlying demented baseline. . All questions were answered. Understanding of the information was demonstrated.     Further recommendations and impressions will follow

## 2020-01-20 NOTE — PLAN OF CARE
Assumed care @ 1900. Patient alert oriented x2-3, forgetful. Difficult to assess. Able to follow some commands. Neuro q 4h. Family @ bedside. Tried having bowel movement. Small amount bm noted. Patient want to try later. He said he is tired.   HD daishas or wet/gurgly vocal quality is noted   Outcome: Progressing

## 2020-01-20 NOTE — PROGRESS NOTES
INFECTIOUS DISEASE PROGRESS NOTE    Amena Cronin.  Patient Status:  Observation    10/18/1940 MRN [id number]   [facility] 3NE-A Attending Alda Mancera MD   [facility] Day # PRN  •  atorvastatin (LIPITOR) tab 80 mg, 80 mg, Oral, Nightly  •  aspirin 300 MG rectal suppository 300 mg, 300 mg, Rectal, Daily **OR** aspirin tab 325 mg, 325 mg, Oral, Daily  •  Heparin Sodium (Porcine) 5000 UNIT/ML injection 5,000 Units, 5,000 Units, 138 139   K 3.6 3.8    105   CO2 25.0 23.0       Microbiology    Reviewed in EMR,     Radiology: reviewed     ASSESSMENT/ PLAN:    1. R hallux wound with exposed bone- c/w OM (eventhough xray is nl, can take time for changes to become apparent in xra

## 2020-01-20 NOTE — PROGRESS NOTES
[facility]  Nephrology Progress Note    Nora Miller.  Patient Status:  Observation    10/18/1940 MRN [id number]   [facility] 3NE-A Attending Yudelka Dutta MD   [facility] Day # 4 PCP Andres Roberts MD       SUBJECTIVE: rectal suppository 10 mg, 10 mg, Rectal, Daily PRN  Midodrine HCl (PROAMATINE) tab 10 mg, 10 mg, Oral, TID  epoetin marquise-epbx (RETACRIT) 43790 UNIT/ML injection 10,000 Units, 10,000 Units, Intravenous, Once in dialysis  Albumin Human (ALBUMINAR) 25 % solut 20 mg, 20 mg, Oral, QAM AC  rivastigmine (EXELON) 9.5 MG/24HR patch 1 patch, 1 patch, Transdermal, Nightly          Impression/Plan:    #1. ESRD- due to DM/HTN. HD today per usual routine     #2. Anemia- due to ESRD. ESAs for goal hgb 10-11 gms     #3.

## 2020-01-20 NOTE — PLAN OF CARE
Pt is oriented x2, drowsy. Follows most commands, confused @ times. Pt with left arm tremoring this AM, Dr. Nohelia Berg aware, no further ordered. HD  today. Family met with palliative, selective treatment requested.  Keppra level high, per Dr. Abraham Powers, redraw

## 2020-01-20 NOTE — PROGRESS NOTES
[facility] Hospitalist Progress Note     PCP: Jm Cummins MD    Chief Complaint: follow-up    Overnight/Interim Events:      SUBJECTIVE:    Yesterday had an \"unresponsive episode\", myoclonic jerks noted. Pt woke when cards apn touched arm. VSS. aspirin  300 mg Rectal Daily    Or   • aspirin  325 mg Oral Daily   • Heparin Sodium (Porcine)  5,000 Units Subcutaneous Q8H [address]   • amiodarone HCl  200 mg Oral Nightly   • Vitamin D3  2,000 Units Oral Daily   • folic acid  1 mg Oral Daily   • levETIRAceta care d/w pt.         Case dw daughter outside room    [facility] Hospitalist  [phone]

## 2020-01-20 NOTE — PROCEDURES
ELECTROENCEPHALOGRAM REPORT        Name: Yuliya Nieves. Age: 70year old  : 10/18/1940  Date: 2020  MRN [id number]   Diagnosis: Staring episode, unresponsiveness, history of seizures       A portable bedsideEEG was obtained.   No sedation w

## 2020-01-20 NOTE — OCCUPATIONAL THERAPY NOTE
Attempted to see patient bedside for re-evaluation. However, patient unavailable due to undergoing dialysis. Will continue to follow. Thank you.

## 2020-01-21 NOTE — PHYSICAL THERAPY NOTE
Attempted to see Pt this AM - RN Graham Leventhal aware of attempt. Writer spoke to Pt and family at bedside - Pt still with hypotension, and not appropriate for OOB activity at this time.   Will f/u later today if time permits, after all other patients are attempted

## 2020-01-21 NOTE — PROGRESS NOTES
[facility] Hospitalist Progress Note     PCP: Jose Mcleod MD    Chief Complaint: follow-up    Overnight/Interim Events:      SUBJECTIVE:    No new neurological events. BP intermittently low but overall improved. No fevers.       OBJECTIVE:  Temp:  [98 ° Nightly   • aspirin  300 mg Rectal Daily    Or   • aspirin  325 mg Oral Daily   • Heparin Sodium (Porcine)  5,000 Units Subcutaneous Q8H [address]   • amiodarone HCl  200 mg Oral Nightly   • Vitamin D3  2,000 Units Oral Daily   • folic acid  1 mg Oral Daily   • Plan of care d/w pt.         Case dw daughter outside room    [facility] Hospitalist  [phone]

## 2020-01-21 NOTE — SLP NOTE
SPEECH DAILY NOTE - INPATIENT    ASSESSMENT & PLAN   ASSESSMENT  Pt seen this AM at bedside to assess toleration of diet recommendations. Pt was lethargic and in bed. Pts daughter at bedside and noted pt was tolerating diet well.  Pts daughter noted pt had further inpatient SLP service warranted;Aspiration precautions                GOALS  Goal #1 The patient will tolerate regular consistency and thin liquids without overt signs or symptoms of aspiration with 100 % accuracy over 1-2 session(s).  Met   Goal #2

## 2020-01-21 NOTE — PLAN OF CARE
Assumed care @ 1900. Patient alert oriented x2-3. More awake  On telemetry monitoring. Denies SOB, Still complaints pain on his right toe  Updated patient family with plan of care, verbalizes understanding. Ensures patient is safe at all times.   Fiona Camp

## 2020-01-21 NOTE — PROGRESS NOTES
[facility]  Nephrology Progress Note    Faby Bermanippo.  Patient Status:  Observation    10/18/1940 MRN [id number]   [facility] 3NE-A Attending Sammy Encarnacion MD   Hosp Day # 5 PCP Addis Villagran MD       SUBJECTIVE: Daily  levETIRAcetam (KEPPRA) tab 500 mg, 500 mg, Oral, BID  LORazepam (ATIVAN) tab 0.5 mg, 0.5 mg, Oral, QID PRN  Pantoprazole Sodium (PROTONIX) EC tab 20 mg, 20 mg, Oral, QAM AC  rivastigmine (EXELON) 9.5 MG/24HR patch 1 patch, 1 patch, Transdermal, Nigh (ALBUMINAR) 25 % solution 100 mL, 100 mL, Intravenous, PRN  cefTAZidime (FORTAZ) 2 g in sodium chloride 0.9% 100 mL IVPB, 2 g, Intravenous, Once per day on Fri    And  cefTAZidime (FORTAZ) 1 g in sodium chloride 0.9% 100 mL IVPB, 1 g, Intravenous, Once per peroneal/ant tibial  Multispecialty care with podiatry/vascular/ID following.      4. Hypotension- cont midodrine    5.  AMS/dementia - wax/waning symptoms - neurology evaluation        Questions/concerns were discussed with patient and/or family by bedside

## 2020-01-21 NOTE — CONSULTS
[address] [facility]  [id number]  Hospital Day #4  Date of Consult:   1/19/2020        Reason for Consultation:      Consult requested by Dr. Rachel Dominguez  for evaluation of palliative care needs (non-insulin dependent diabetes mellitus)    • Pneumonia due to organism 2018   • Renal disorder    • Renal failure (ARF), acute on chronic [facility])    • Seizure disorder ([facility] [address]    • Stroke ([facility] 2009   • TIA (transient ischemic attack) 2010   • Visual impairm Intravenous, Once **FOLLOWED BY** Vancomycin HCl (VANCOCIN) 500 mg in sodium chloride 0.9% 150 mL IVPB, 500 mg, Intravenous, Once per day on Mon Wed Fri  •  docusate sodium (COLACE) cap 100 mg, 100 mg, Oral, BID  •  PEG 3350 (MIRALAX) powder packet 17 g, 1 01/12/2020       Coags:  Lab Results   Component Value Date    PT 24.9 (H) 03/15/2011    INR 1.26 (H) 01/12/2020    PTT 38.4 (H) 01/12/2020     Chemistry:  Lab Results   Component Value Date    CREATSERUM 8.28 (H) 01/20/2020    BUN 39 (H) 01/20/2020    NA Mainly Assist Normal or Reduced Full or confused   30 Bedbound Extensive Disease  Can’t do any work Max Assist  Total Care Reduced Drowsy/confused   20 Bedbound Extensive Disease  Can’t do any work Max Assist  Total Care Minimal Drowsy/confused   10 Bedbou well as the caregivers all support and help keep him healthy. Reginewill Mandujano recognizes this hospitalization is different then previous hospitalizations as he seems weak.  We then discussed his baseline and following this admission his mobility may be decreased an Requires a longer time to feed the patient  - Family worries about not doing everything in their power to address the feeding difficulty or or starving their family member  - Afraid to hasten death     She is not ready to discuss a plan if her father can n symptoms they are evaluated and treated. She stated she will be accepting of comfort care/ hospice when the MDs tell her there are no treatment options available.  She choose comfort care on a previous POLST under the intention she does not want her father POLST: completed. Will void the previous POLST.   - Healthcare guardian is her daughter Gina Galvan. Will scan in the chart. - Psychosocial: Emotional support provided to the patient and family.      - Disposition: ongoing goals of care, most likely

## 2020-01-21 NOTE — OCCUPATIONAL THERAPY NOTE
Attempted to see pt for skilled OT services this date. RN reports pt with low BP this a.m. Will re-attempt as appropriate and as schedule allows.  Alicia De La Cruz, 01/21/20, 8:46 AM

## 2020-01-21 NOTE — PHYSICAL THERAPY NOTE
PHYSICAL THERAPY TREATMENT NOTE - INPATIENT    Room Number: 5746/2750-A     Session: 2  Number of Visits to Meet Established Goals: 5    Presenting Problem: hypotension, PVD with R toe infection, s/p angioplasty RLE    Problem List  Principal Problem: for dialysis   • UPPER GI ENDOSCOPY,EXAM         SUBJECTIVE  \"It is 1998\" \"oh really 2020\"    Patient’s self-stated goal is - unknown    OBJECTIVE  Precautions: Cardiac;Bed/chair alarm;None(surgical shoe R foot)    WEIGHT BEARING RESTRICTION  Weight Be independence. Pt's family member (daughter) present for session. Discussed GOC, and short term/long term goals for optimal functional independence and safety.     Transfers    Supine<>Sit: Max A x 2 (retropulsive)  Sit<>Stand: NT  Transfers: jimbo   Gait: with a RW with assist household distances per daughter. DISCHARGE RECOMMENDATIONS  PT Discharge Recommendations: Sub-acute rehabilitation     PLAN  PT Treatment Plan: Bed mobility; Family education;Patient education; Neuromuscular re-educate;Strengthenin

## 2020-01-21 NOTE — PROGRESS NOTES
[facility] Cardiology Progress Note        Gloria Vann.  Patient Status:  Observation    10/18/1940 MRN [id number]   [facility] 3NE-A Attending Lauren Sal MD   [facility] Day # 5 PCP Tito Carter MD ()/(26-48) 93/30      Temp: 98.1 °F (36.7 °C)  Pulse: 70  Resp: 18  BP: 93/30  General:  Appears comfortable  HEENT: No focal deficits. Neck: No JVD, carotids 2+ no bruits. Cardiac: Regular S1S2. No S3, S4, rub, click. 3-8/2 systolic   murmur.   L was reduced. There was moderate stenosis. Trivial regurgitation. Peak velocity (S):     331cm/sec. Mean gradient (S): 19mm Hg. Peak gradient (S): 44mm Hg. Valve     area (Vmax): 1.25cm^2.  4. Mitral valve: Moderately calcified annulus.  There was mild r

## 2020-01-21 NOTE — PROGRESS NOTES
[facility]  Neurology  Hospital Progress Report    Pepe Leonard.  Patient Status:  Inpatient    10/18/1940 MRN [id number]   [facility] 7NE-A Attending Ander Metz MD   [facility] Day # 5 PCP Blayne Zuniga MD   Da History  Past Surgical History:   Procedure Laterality Date   • COLECTOMY     • COLONOSCOPY N/A 5/2/2019    Performed by Namrata Wilkinson MD at [facility] ENDOSCOPY   • ESOPHAGOGASTRODUODENOSCOPY (EGD) N/A 5/1/2019    Performed by Namrata Wilkinson MD at [facility] ENDOSCOPY suppository 300 mg, 300 mg, Rectal, Daily    Or  aspirin tab 325 mg, 325 mg, Oral, Daily  Heparin Sodium (Porcine) 5000 UNIT/ML injection 5,000 Units, 5,000 Units, Subcutaneous, Q8H [facility]  amiodarone HCl (PACERONE) tab 200 mg, 200 mg, Oral, Nightly  Calcium kg)   SpO2 98%   BMI 22.18 kg/m²    General: The patient appears attentive, no signs of distress  Head: Normocephalic, atraumatic. Eyes: anicteric  ENT: normal tongue, normal mucosa.    Neck: supple  Lymph Nodes:  No adenopathy  Cardiovascular: Regular rat be of further value. Dictated by: Tucker Crow MD on 1/14/2020 at 11:01     Approved by: Tucker Crow MD on 1/14/2020 at 11:04          Ct Brain Or Head ([id number])    Result Date: 1/12/2020  CONCLUSION:  1. No acute process.  2. Moderate diffuse atroph than 50% stenosis range. Maximum measured stenosis with grayscale ultrasound for the right side is 49% proximal ICA, and for the left side is 49% carotid bulb.       Dictated by: Sameer Fisher MD on 1/13/2020 at 12:21     Approved by: Sameer Fisher MD o nurse.  I do not anticipate full recovery until correction of underlying medical problems. The prognosis here is poor. The patient can recover to his underlying demented baseline. . All questions were answered.  Understanding of the information was demonst

## 2020-01-21 NOTE — CM/SW NOTE
Spoke with pt's dtr Radhika Streeter who said that the current d/c plan is to have pt return home with his caregivers, [facility], and [facility]. Dtr also wants pt to be on Telehealth - orders for [facility], Telehealth, and PC are completed.   Pt may be d/c'd jeannie

## 2020-01-21 NOTE — PLAN OF CARE
Assumed care @5145  low blood pressures, MD notified - continue midodrine. A&Ox2, drowsy, follows commands. Neuro's q4. Seizure precautions in place. RA  NSR , Denies Pain, Total lift. Tolerating Regular diet. Anuric. BM today.   Patient will go home Living  Goal: Achieve highest/safest level of independence in self care  Description  Interventions:  - Assess ability and encourage patient to participate in ADLs to maximize function  - Promote sitting position while performing ADLs such as feeding, groo

## 2020-01-22 NOTE — DIETARY NOTE
[facility]    NUTRITION ASSESSMENT    Pt does not meet malnutrition criteria.     NUTRITION DIAGNOSIS/PROBLEM:    Increased nutrient needs related to physiologic causes increasing nutrient needs as evidenced by ESRD on HD and diabetic foot ulcer - ongo Readings from Last 12 Encounters:  01/21/20 : 56.8 kg (125 lb 3.2 oz)  12/05/19 : 69.4 kg (153 lb)  11/25/19 : 55.8 kg (123 lb)  11/19/19 : 57 kg (125 lb 10.6 oz)  10/22/19 : 58 kg (127 lb 13.9 oz)  10/15/19 : 57 kg (125 lb 10.6 oz)  09/10/19 : 57 kg (125

## 2020-01-22 NOTE — PROGRESS NOTES
[facility]  Nephrology Progress Note    Nessa Paula.  Patient Status:  Observation    10/18/1940 MRN [id number]   [facility] 3NE-A Attending Norma Fuentes MD   [address] Day # 6 PCP Savannah Nix MD       SUBJECTIVE: * 116*       Recent Labs   Lab 01/20/20  0539   ALB 2.4*       Recent Labs   Lab 01/19/20  1215   PGLU 201*       Meds:   epoetin marquise-epbx (RETACRIT) 52445 UNIT/ML injection 10,000 Units, 10,000 Units, Intravenous, Once in dialysis  bisacodyl (D BID  LORazepam (ATIVAN) tab 0.5 mg, 0.5 mg, Oral, QID PRN  Pantoprazole Sodium (PROTONIX) EC tab 20 mg, 20 mg, Oral, QAM AC  rivastigmine (EXELON) 9.5 MG/24HR patch 1 patch, 1 patch, Transdermal, Nightly          Impression/Plan:    #1.   ESRD- due to DM/HT

## 2020-01-22 NOTE — PROGRESS NOTES
[facility] [address] [place] [address] Cardiology Progress Note        Agata Ask.  Patient Status:  Observation    10/18/1940 MRN [id number]   [facility] 3NE-A Attending Raheel Taylor MD   [facility] Day # 6 PCP Carmen Santana MD °F (36.9 °C)  Pulse:  [65-77] 77  Resp:  [14-25] 25  BP: ()/(37-62) 102/43      Temp: 98.4 °F (36.9 °C)  Pulse: 77  Resp: 25  BP: 102/43  General:  Appears comfortable  HEENT: No focal deficits. Neck: No JVD, carotids 2+ no bruits.   Cardiac: Regular moderately     thickened, moderately calcified leaflets. Cusp separation was reduced. There was moderate stenosis. Trivial regurgitation. Peak velocity (S):     331cm/sec. Mean gradient (S): 19mm Hg. Peak gradient (S): 44mm Hg.  Valve     area (Vmax): 1

## 2020-01-22 NOTE — OCCUPATIONAL THERAPY NOTE
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 Per daughter, recent function has included amb with RW and with supervision/cga to min assist depending on the day. On good days he is able to amb about 100 ft.  Pt lives with her and her fa Activity Short Form  How much help from another person does the patient currently need…  -   Putting on and taking off regular lower body clothing?: A Lot  -   Bathing (including washing, rinsing, drying)?: A Lot  -   Toileting, which includes using toilet rest and sleep, leisure and social participation.      The patient is functioning below his previous functional level and would benefit from skilled inpatient OT to address the above deficits, maximizing patient’s ability to return safely to his prior level will be minimum assistance with bilateral AROM HEP (home exercise program).

## 2020-01-22 NOTE — PROGRESS NOTES
[facility] Hospitalist Progress Note     PCP: Andres Roberts MD    Chief Complaint: follow-up    Overnight/Interim Events:      SUBJECTIVE:    Pt seen and examined. More drowsy late this morning per daughter at bedside, but mental status flucuates.   No F/C Clopidogrel Bisulfate  300 mg Oral Once   • atorvastatin  80 mg Oral Nightly   • aspirin  300 mg Rectal Daily    Or   • aspirin  325 mg Oral Daily   • Heparin Sodium (Porcine)  5,000 Units Subcutaneous Q8H [address]   • amiodarone HCl  200 mg Oral Nightly   • Vi hx  -keppra, level pending     Proph  -sq hep     DNR     Dispo: inpt care. Plan of care d/w pt's daughter at bedside. Possible discharge home with home care later today vs tomorrow pending mental status, BP, how he tolerates HD, and consultants opinions.

## 2020-01-22 NOTE — PLAN OF CARE
Received call from micro lab regarding wound culture results. Read back that it is 3plus MRSA. Results given to Dr Chencho Rios. No further orders at this time.

## 2020-01-22 NOTE — PROGRESS NOTES
INFECTIOUS DISEASE PROGRESS NOTE    Keven Perez.  Patient Status:  Observation    10/18/1940 MRN [id number]   [facility] 3NE-A Attending Jaydon Saha MD   [facility] Day # PRN **OR** acetaminophen (TYLENOL) 650 MG rectal suppository 650 mg, 650 mg, Rectal, Q4H PRN  •  atorvastatin (LIPITOR) tab 80 mg, 80 mg, Oral, Nightly  •  aspirin 300 MG rectal suppository 300 mg, 300 mg, Rectal, Daily **OR** aspirin tab 325 mg, 325 mg, O on 01/12/20   1.  AEROBIC BACTERIAL CULTURE     Status: Abnormal (Preliminary result)    Collection Time: 01/20/20 11:28 AM   Result Value Ref Range    Aerobic Culture Result 3+ growth Staphylococcus aureus, MRSA (A) N/A    Aerobic Smear No WBCs seen N/A

## 2020-01-22 NOTE — PLAN OF CARE
Pt now awake and talking a little . Knows daughter. New date of birth. No tremors. Will still keep npo and speech to see tomorrow. Per Dr Traci Guzman pt to have HD tomorrow instead of today. AB tomorrow. pharamcy made change to emar. Will cont to monitor.

## 2020-01-22 NOTE — PROGRESS NOTES
INFECTIOUS DISEASE PROGRESS NOTE    Yuliya Nieves.  Patient Status:  Observation    10/18/1940 MRN [id number]   [facility] 3NE-A Attending Jc Bradford MD   [facility] Day # Rectal, Q4H PRN  •  atorvastatin (LIPITOR) tab 80 mg, 80 mg, Oral, Nightly  •  aspirin 300 MG rectal suppository 300 mg, 300 mg, Rectal, Daily **OR** aspirin tab 325 mg, 325 mg, Oral, Daily  •  Heparin Sodium (Porcine) 5000 UNIT/ML injection 5,000 Units, 5 ALB  --  2.4*    139   K 3.6 3.8    105   CO2 25.0 23.0       Microbiology    Reviewed in EMR,     Radiology: reviewed     ASSESSMENT/ PLAN:    1. R hallux wound with exposed bone- c/w OM (eventhough xray is nl, can take time for changes to b

## 2020-01-22 NOTE — PROGRESS NOTES
[facility]  Neurology  Hospital Progress Report    Jocelyne Diallo.  Patient Status:  Inpatient    10/18/1940 MRN [id number]   [facility] 7NE-A Attending Sinai Rider MD   [facility] Day # 6 PCP Deepthi Patton MD   Da History  Past Surgical History:   Procedure Laterality Date   • COLECTOMY     • COLONOSCOPY N/A 5/2/2019    Performed by Walt Estrada MD at [facility] ENDOSCOPY   • ESOPHAGOGASTRODUODENOSCOPY (EGD) N/A 5/1/2019    Performed by Walt Estrada MD at [facility] ENDOSCOPY (LIPITOR) tab 80 mg, 80 mg, Oral, Nightly  aspirin 300 MG rectal suppository 300 mg, 300 mg, Rectal, Daily    Or  aspirin tab 325 mg, 325 mg, Oral, Daily  Heparin Sodium (Porcine) 5000 UNIT/ML injection 5,000 Units, 5,000 Units, Subcutaneous, Q8H [address]  amio Physical Exam:  /37 (BP Location: Left arm)   Pulse 65   Temp 97.5 °F (36.4 °C) (Oral)   Resp 18   Wt 125 lb 3.2 oz (56.8 kg)   SpO2 97%   BMI 22.18 kg/m²    General: The patient appears attentive, no signs of distress  Head: Normocephalic, atrauma osteomyelitis. If there is further clinical concern for osteomyelitis, an MRI of the right foot may be of further value.    Dictated by: Kristen Grajeda MD on 1/14/2020 at 11:01     Approved by: Kristen Grajeda MD on 1/14/2020 at 11:04          Ct Brain Or 12: 21     Approved by: Sameer Fisher MD on 1/13/2020 at 12:22          Mri Stroke Brain Dwi Only(no Iv)(cpt=70551)    Result Date: 1/18/2020  CONCLUSION:  1. Diffusion-weighted imaging is negative for regions of acute infarct.  2. Evidence of atrophy and underlying demented baseline. All questions were answered. Understanding of the information was demonstrated. Further recommendations and impressions will follow repeat examinations and review of pertinent data.  The reader is asked to contact this e

## 2020-01-22 NOTE — PLAN OF CARE
Assumed care of pt at 1900. Pt AOx2. Daughter at bedside. NSR.  RA.  . Denies pain. Anuric. Seizure Precautions. Plan for pt to go home palliative home health. Waiting wound cx results. Will continue to monitor.

## 2020-01-22 NOTE — PROGRESS NOTES
[address] [facility] [address] Follow Up     Ana Maria Anderson  [id number]  Hospital Day #6  Date of Consult: 01/20/20  Patient seen at: Gerry:      Patient was seen and examined with Jocelyne  at the bedside. mg, 4 mg, Intravenous, Q6H PRN **OR** Metoclopramide HCl (REGLAN) injection 5 mg, 5 mg, Intravenous, Q8H PRN  •  acetaminophen (TYLENOL) tab 650 mg, 650 mg, Oral, Q4H PRN **OR** acetaminophen (TYLENOL) 650 MG rectal suppository 650 mg, 650 mg, Rectal, Q4H ALB 2.4 (L) 01/20/2020    ALKPHO 105 01/12/2020    BILT 0.7 01/12/2020    TP 8.3 (H) 01/12/2020    AST 28 01/12/2020    ALT 32 01/12/2020    PSA 7.90 (H) 01/09/2018    MG 2.4 01/22/2020    PHOS 3.1 01/20/2020    TROP <0.045 01/12/2020       Imaging:  Ct Disease  Can’t do any work Max Assist  Total Care Minimal Drowsy/confused   10 Bedbound/coma Extensive Disease  Can’t do any work  coma  Max Assist  Total Care Mouth care Drowsy or coma   0 Death     Palliative Care Assessment     Goals of Care: When I ent hopes he remains in the hospital today. She was tearful through our discussion, she said no one is saying anything and to hear what I say helps her understand even though it is difficult.  I told her his plan of care remains the same until she tells us damari Palliative care will sign off. If there is a change in his condition please re consult.

## 2020-01-22 NOTE — PROGRESS NOTES
Received a call from the patient's nurse. The patient had an episode of \"flailing\" arms and legs. Tongue moving side-to-side. Patient reportedly somnolent. Blood pressure low but not markedly divergent from previous measurements.   Patient sent to CT

## 2020-01-22 NOTE — PLAN OF CARE
Results/update with Dr Nicole Frazier. Orders received. Will cont to monitor. He will see pt in am. Family updated.

## 2020-01-23 NOTE — CM/SW NOTE
Care Progression Note:  Active Acute Medical Issue: ESRD (end stage renal disease) ([facility] [address] [facility]   Other Contributing Medical Factors/Dx.: missed HD 1/22 due to MS issues and hypotension. Pt is to have HD today. Mental status waxing and waning.    Length of stay: 7

## 2020-01-23 NOTE — PROGRESS NOTES
[facility]  Nephrology Progress Note    Jeniffer Montoya.  Patient Status:  Observation    10/18/1940 MRN [id number]   [facility] 3NE-A Attending Adriana Rodarte MD   [facility] Day # 7 PCP Itzel Correa MD       SUBJECTIVE: 8.67*   CA 8.8 9.2 9.1   MG  --  2.4  --    PHOS 3.1  --   --    * 116* 120*       Recent Labs   Lab 01/20/20  0539   ALB 2.4*       Recent Labs   Lab 01/19/20  1215 01/22/20  1034   PGLU 201* 236*       Meds:   Vancomycin HCl (VANCOCIN) 500 mg in s chewable tab 500 mg, 500 mg, Oral, Daily PRN  Vitamin D3 cap 2,000 Units, 2,000 Units, Oral, Daily  folic acid (FOLVITE) tab 1 mg, 1 mg, Oral, Daily  LORazepam (ATIVAN) tab 0.5 mg, 0.5 mg, Oral, QID PRN  Pantoprazole Sodium (PROTONIX) EC tab 20 mg, 20 mg,

## 2020-01-23 NOTE — PROGRESS NOTES
INFECTIOUS DISEASE PROGRESS NOTE    Álvaro Gonzalez.  Patient Status:  Observation    10/18/1940 MRN [id number]   [facility] 3NE-A Attending Osiris Ford MD   [facility] Day # Metoclopramide HCl (REGLAN) injection 5 mg, 5 mg, Intravenous, Q8H PRN  •  acetaminophen (TYLENOL) tab 650 mg, 650 mg, Oral, Q4H PRN **OR** acetaminophen (TYLENOL) 650 MG rectal suppository 650 mg, 650 mg, Rectal, Q4H PRN  •  atorvastatin (LIPITOR) tab 80 01/23/20  0608   * 116* 120*   BUN 39* 29* 41*   CREATSERUM 8.28* 6.63* 8.67*   GFRAA 6* 8* 6*   GFRNAA 6* 7* 5*   CA 8.8 9.2 9.1   ALB 2.4*  --   --     138 138   K 3.8 4.0 3.6    102 101   CO2 23.0 27.0 26.0       Microbiology    Revie

## 2020-01-23 NOTE — PROGRESS NOTES
[facility] Hospitalist Progress Note     PCP: Maryellen Huff MD    Chief Complaint: follow-up    Overnight/Interim Events:      SUBJECTIVE:  Pt seen and examined. Mental status has continued to wax and wane but is improved this AM.  No F/C.   Family at beds mg Oral Once   • atorvastatin  80 mg Oral Nightly   • aspirin  300 mg Rectal Daily    Or   • aspirin  325 mg Oral Daily   • Heparin Sodium (Porcine)  5,000 Units Subcutaneous Q8H [facility]   • amiodarone HCl  200 mg Oral Nightly   • Vitamin D3  2,000 Units Oral s/p RLE angioplasty today  - post op care per vascular   - plan local wound care, no amputation for now     Hernia Surgery planned for tmrw electively  -notified surgery, re-schedule      Seizure hx  -keppra, level pending     Proph  -sq hep     DNR     Eduardo Xie

## 2020-01-23 NOTE — PLAN OF CARE
Assumed care at [address] and oriented x1-2, drowsy & forgetful  More awake than earlier today  Daughter at bedside  Currently on RA, spO2 90's  Seizure precautions maintained  Contact precautions maintained  Able to tolerate PO meds crushed with apple s precautions  - Recommend use of total lift for transfers    Outcome: Progressing     Problem: Impaired Activities of Daily Living  Goal: Achieve highest/safest level of independence in self care  Description  Interventions:  - Assess ability and encourage

## 2020-01-23 NOTE — PLAN OF CARE
Pt less responsive. Unable to answer questions. Vss. Dr Toyin Montano notified. Orders received. Dr Jennyfer Cassidy notified. Will cont to monitor.

## 2020-01-24 NOTE — PROGRESS NOTES
[facility]  Nephrology Progress Note    Itzel Kuhn.  Patient Status:  Observation    10/18/1940 MRN [id number]   [facility] 3NE-A Attending Kat Bolanos MD   [facility] Day # 8 PCP Quin Hernandez MD       SUBJECTIVE: 0.5 mg, 0.5 mg, Oral, QID PRN  Pantoprazole Sodium (PROTONIX) EC tab 20 mg, 20 mg, Oral, QAM AC  rivastigmine (EXELON) 9.5 MG/24HR patch 1 patch, 1 patch, Transdermal, Nightly            Physical Exam:   /67 (BP Location: Left arm)   Pulse 70   Temp by bedside.     Nasrin Rosario  1/24/2020

## 2020-01-24 NOTE — PROGRESS NOTES
INFECTIOUS DISEASE PROGRESS NOTE    Jeremy Turk.  Patient Status:  Observation    10/18/1940 MRN [id number]   [facility] 3NE-A Attending Kendall Dickson MD   [facility] Day # PRN  •  atorvastatin (LIPITOR) tab 80 mg, 80 mg, Oral, Nightly  •  aspirin 300 MG rectal suppository 300 mg, 300 mg, Rectal, Daily **OR** aspirin tab 325 mg, 325 mg, Oral, Daily  •  Heparin Sodium (Porcine) 5000 UNIT/ML injection 5,000 Units, 5,000 Units, 3.8 4.0 3.6    102 101   CO2 23.0 27.0 26.0       Microbiology    Reviewed in EMR,   Hospital Encounter on 01/12/20   1.  AEROBIC BACTERIAL CULTURE     Status: Abnormal    Collection Time: 01/20/20 11:28 AM   Result Value Ref Range    Aerobic Culture

## 2020-01-24 NOTE — PLAN OF CARE
Patient is alert to place at start of shift. During dialysis unable to tell me his name but knew he was in the hospital. Able to tell me his name and answer questions during the 0100 vitals.   Albumin administered at start of dialysis to maintain blood pre gait  - Educate and engage patient/family in tolerated activity level and precautions  - Recommend use of total lift for transfers    Outcome: Progressing     Problem: Impaired Activities of Daily Living  Goal: Achieve highest/safest level of independence

## 2020-01-24 NOTE — PROGRESS NOTES
[facility] Hospitalist Progress Note     PCP: Zeny Monet MD    Chief Complaint: follow-up    Overnight/Interim Events:      SUBJECTIVE:  Pt seen and examined.   Mental status has continued to wax and wane, was improved earlier this AM but now pt is non v mg Oral TID   • docusate sodium  100 mg Oral BID   • Clopidogrel Bisulfate  75 mg Oral Daily   • Clopidogrel Bisulfate  300 mg Oral Once   • atorvastatin  80 mg Oral Nightly   • aspirin  300 mg Rectal Daily    Or   • aspirin  325 mg Oral Daily   • Heparin rise    Hypoxia  -on 2L, supp o2 as needed  -IS  -saw Dr. Christopher Ibanez in clinic, notify if any concerns     R Great toe infection  Osteomyelitis of R great toe  -wound eval, podiatry and vascular consulted, apprec  -XRs  -angio/plasty per vascular   -try to [facility]

## 2020-01-24 NOTE — PLAN OF CARE
Assumed care at 0730  Patient Alert to self and place  NSR on tele  More alert and interactive today per family report  PRN tylenol given for generalized pain  Low grade temps this AM, bcx sent   Dressing intact to R toe, changed this afternoon  Plan for H

## 2020-01-24 NOTE — PLAN OF CARE
Assumed care at [facility] to self and place  Has periods where he is nonverbal, but awake and nods yes/no to questions  MDs aware of the above   NSR on tele  PRN tylenol given for headache with relief  Pt c/o constipation, PRN miralax given  HD ordered f

## 2020-01-25 NOTE — PROGRESS NOTES
[facility] Hospitalist Progress Note     PCP: Addis Villagran MD    Chief Complaint: follow-up    Overnight/Interim Events:      SUBJECTIVE:  Pt seen and examined. Mental status still waxing and waning per daughter at bedside.   Pt more alert for me this AM sodium  100 mg Oral BID   • Clopidogrel Bisulfate  75 mg Oral Daily   • Clopidogrel Bisulfate  300 mg Oral Once   • atorvastatin  80 mg Oral Nightly   • aspirin  300 mg Rectal Daily    Or   • aspirin  325 mg Oral Daily   • Heparin Sodium (Porcine)  5,000 U DM/HTN  -renal following, HD MWF  -HD again today, short tx no UF    Anemia d/t above  -follow hgb     Thrombocytosis   - suspect reactive due to infection   - continue to follow, consider hem c/s if this continues to rise    Hypoxia  -on 2L, supp o2 as ne

## 2020-01-25 NOTE — CONSULTS
[facility] dosing service    Follow-up Pharmacokinetic Consult for Vancomycin Dosing     Jinny Lam. is a 70year old male who is being treated for osteomyelitis.    Patient is on day 8 of Vancomycin and is currently receiving 500 mg IV with d pharmacokinetics,  and renal function)    2. Pharmacy will follow and adjust as necessary.       Radhika Cardenas PharmD  1/25/2020  1:25 PM  [facility]: [phone]

## 2020-01-25 NOTE — PROGRESS NOTES
Chart reviewed. Continued waxing and waning arousal and performance. LEV elevated (before dose drop)  Recommend holding LEV after HD today. May need to consider different AED.   Mental status changes are multifactorial.    Gilberto Solo MD

## 2020-01-25 NOTE — PLAN OF CARE
Assumed care of pt @ 0730. Pt is A/O to self. Pt pleasant and cooperative, follows commands. Pt takes meds easily crushed with applesauce. Aspiration and seizure precautions maintained. incont of stool had BM this AM. Scheduled for HD this evening.  Pt sarah of total lift for transfers    Outcome: Progressing     Problem: Impaired Activities of Daily Living  Goal: Achieve highest/safest level of independence in self care  Description  Interventions:  - Assess ability and encourage patient to participate in ADL

## 2020-01-25 NOTE — PLAN OF CARE
Assumed pt care at [address] [place] [address] for the night shift. Pt resting in bed. Alert to self and is able to recognize his dght. [place] some of the commands. Neuro status cont to wax and wane. Generalized discomfort and weakness in all extremities. Tylenol po given.  Pt ab precautions  - Recommend use of total lift for transfers    Outcome: Not Progressing     Problem: Impaired Activities of Daily Living  Goal: Achieve highest/safest level of independence in self care  Description  Interventions:  - Assess ability and encour

## 2020-01-26 NOTE — PROGRESS NOTES
[facility]  Nephrology Progress Note    Jocelyne Diallo.  Patient Status:  Observation    10/18/1940 MRN [id number]   [facility] 3NE-A Attending Jeremy Funez MD   Hosp Day # 10 PCP Deepthi Patton MD       SUBJECTIVE: tab 20 mg, 20 mg, Oral, QAM AC  rivastigmine (EXELON) 9.5 MG/24HR patch 1 patch, 1 patch, Transdermal, Nightly            Physical Exam:   /47 (BP Location: Left arm)   Pulse 72   Temp 98.7 °F (37.1 °C) (Oral)   Resp 22   Wt 121 lb 0.5 oz (54.9 kg)

## 2020-01-26 NOTE — PLAN OF CARE
Assumed care of pt @ 7970. Pt is A/O to self, place and situation, disoriented to time. Pt pleasant and cooperative, follows commands. Pt takes meds easily crushed with applesauce. Aspiration and seizure precautions maintained.  incont of stool had BM this level and precautions  - Recommend use of total lift for transfers    Outcome: Progressing     Problem: Impaired Activities of Daily Living  Goal: Achieve highest/safest level of independence in self care  Description  Interventions:  - Assess ability and

## 2020-01-26 NOTE — PLAN OF CARE
Patient alert and oriented times 1. Sometimes follows commands, sometimes doesn't. Meds given per MAR. Kept clean and dry. Daughter at bedside. Vital signs stable. Denies any pain or discomfort. Resting comfortably in bed. Call light in reach.

## 2020-01-26 NOTE — CONSULTS
[facility] Onc:Consult Note          SUBJECTIVE:     Reason for Consultation: Thrombocytosis    History of Present Illness:  Patient is a 70year old, male who was admitted with stoke like symptoms; he was not noted to have any new/acute strokes.  He has Right ha Units, Subcutaneous, Q8H [address]  amiodarone HCl (PACERONE) tab 200 mg, 200 mg, Oral, Nightly  Calcium Carbonate Antacid (TUMS) chewable tab 500 mg, 500 mg, Oral, Daily PRN  Vitamin D3 cap 2,000 Units, 2,000 Units, Oral, Daily  folic acid (FOLVITE) tab 1 mg, 1 tobacco: Never Used       Alcohol use No        History reviewed:  Family History   Problem Relation Age of Onset   • Hypertension Father           Review of Systems   Not able to obtain from patient.  He has demential and not able to provide history    OBJ

## 2020-01-26 NOTE — PHYSICAL THERAPY NOTE
PHYSICAL THERAPY TREATMENT NOTE - INPATIENT    Room Number: 4622/4580-G     Session: 3  Number of Visits to Meet Established Goals: 5    Presenting Problem: hypotension, PVD with R toe infection, s/p angioplasty RLE   71 yo admitted with R facial droop an 5/2/2019    Performed by Claudette Mesa, MD at [facility] ENDOSCOPY   • ESOPHAGOGASTRODUODENOSCOPY (EGD) N/A 5/1/2019    Performed by Claudette Mesa, MD at [facility] ENDOSCOPY   • OTHER SURGICAL HISTORY      placement of fistula for dialysis   • UPPER GI ENDOSCOPY,EXAM tested  Distance (ft): 0  Assistive Device: Rolling walker  Pattern: Comment(N/A)  Stoop/Curb Assistance: Not tested  Comment : Pt is unsafe to attempt standing/pivot at this time    Skilled Therapy Provided:   Cognition: alert magnoliaughout, nonverbal, suni mobility; Family education;Patient education; Neuromuscular re-educate;Strengthening;Transfer training  Rehab Potential : Fair  Frequency (Obs): 5x/week    CURRENT GOALS      Goal #1 Patient is able to demonstrate supine - sit EOB @ level: maximum assistance

## 2020-01-26 NOTE — PROGRESS NOTES
INFECTIOUS DISEASE PROGRESS NOTE    Jesse Ohara.  Patient Status:  Observation    10/18/1940 MRN [id number]   [facility] 3NE-A Attending Isabella Bills MD   [address] Day # Units, 5,000 Units, Subcutaneous, Q8H [facility] long[facility]term  •  amiodarone HCl (PACERONE) tab 200 mg, 200 mg, Oral, Nightly  •  Calcium Carbonate Antacid (TUMS) chewable tab 500 mg, 500 mg, Oral, Daily PRN  •  Vitamin D3 cap 2,000 Units, 2,000 Units, Oral, Daily  •  folic a Range    Blood Culture Result No Growth 2 Days N/A   2.  AEROBIC BACTERIAL CULTURE     Status: Abnormal    Collection Time: 01/20/20 11:28 AM   Result Value Ref Range    Aerobic Culture Result 3+ growth Staphylococcus aureus, MRSA (A) N/A    Aerobic Smear N

## 2020-01-26 NOTE — PROGRESS NOTES
[facility] Hospitalist Progress Note     PCP: Maryellen Huff MD    Chief Complaint: follow-up    Overnight/Interim Events:      SUBJECTIVE:  Pt seen and examined.   Much more alert and conversant this AM.  He still c/o widespread pain with touch, cannot lift Meds:     • docusate sodium  100 mg Oral BID   • epoetin marquise-epbx  10,000 Units Intravenous Once in dialysis   • Midodrine HCl  10 mg Oral TID   • Clopidogrel Bisulfate  75 mg Oral Daily   • Clopidogrel Bisulfate  300 mg Oral Once   • atorvastatin likely reactive to infection   - pltc better today     Microcytic anemia  - d/w Dr. Nicholaus Lombard of hem  - concern for iron def anemia, possible GI source   - check iron studies, may benefit from IV iron   - if iron deficient, plan for GI eval in AM as this could

## 2020-01-27 NOTE — PHYSICAL THERAPY NOTE
PHYSICAL THERAPY TREATMENT NOTE - INPATIENT    Room Number: 5681/5580-C     Session: 4  Number of Visits to Meet Established Goals: 5    Presenting Problem: hypotension, PVD with R toe infection, s/p angioplasty RLE   71 yo admitted with R facial droop an 5/2/2019    Performed by Lindsay Snow MD at [facility] ENDOSCOPY   • ESOPHAGOGASTRODUODENOSCOPY (EGD) N/A 5/1/2019    Performed by Lindsay Snow MD at [facility] ENDOSCOPY   • OTHER SURGICAL HISTORY      placement of fistula for dialysis   • UPPER GI ENDOSCOPY,EXAM Device: Rolling walker  Pattern: Comment(N/A)  Stoop/Curb Assistance: Not tested  Comment : chair position therex    Skilled Therapy Provided:     Pt presented in supine upon PT arrival. Pt willing to participate in session, working towards increased funct with gait/transfers resulting in downgrade of overall functional mobility. Due to above deficits, Pt will benefit from continued IP PT, so that patient may achieve highest functional independence/return to baseline.        DISCHARGE RECOMMENDATIONS  PT Dis

## 2020-01-27 NOTE — PROGRESS NOTES
Subjective     No overnight events    • epoetin marquise-epbx  10,000 Units Intravenous Once in dialysis   • docusate sodium  100 mg Oral BID   • Midodrine HCl  10 mg Oral TID   • Clopidogrel Bisulfate  75 mg Oral Daily   • Clopidogrel Bisulfate  300 mg Oral O

## 2020-01-27 NOTE — CM/SW NOTE
Care Progression Note:  Active Acute Medical Issue: ESRD (end stage renal disease) ([address]   Other Contributing Medical Factors/Dx.:   New RUE weakness : MRI pending  Episodes of unresponsiveness : 24 hour EEG to start today  Anemia: due to ESRD.    Rt JUNIOR Cuevas

## 2020-01-27 NOTE — PROGRESS NOTES
INFECTIOUS DISEASE PROGRESS NOTE    Marti Briggs.  Patient Status:  Observation    10/18/1940 MRN [id number]   [facility] 3NE-A Attending Victorina Benavides MD   [facility] Day # (PACERONE) tab 200 mg, 200 mg, Oral, Nightly  •  Calcium Carbonate Antacid (TUMS) chewable tab 500 mg, 500 mg, Oral, Daily PRN  •  Vitamin D3 cap 2,000 Units, 2,000 Units, Oral, Daily  •  folic acid (FOLVITE) tab 1 mg, 1 mg, Oral, Daily  •  Pantoprazole So Value Ref Range    Aerobic Culture Result 3+ growth Staphylococcus aureus, MRSA (A) N/A    Aerobic Smear No WBCs seen N/A    Aerobic Smear 1+ Gram positive cocci in clusters N/A       Susceptibility    Staphylococcus aureus, MRSA -  (no method available)

## 2020-01-27 NOTE — PROGRESS NOTES
[facility]  Progress Note    Jeremy Turk.  Patient Status:  Inpatient    10/18/1940 MRN [id number]   [facility] 7NE-A Attending Dee Lamb MD   [facility] Day # 11 PCP Gwen Tena MD     Subjective:    Feeling bet (H) 150.0 - 450.0 10(3)uL    MCV 75.5 (L) 80.0 - 100.0 fL    MCH 20.9 (L) 26.0 - 34.0 pg    MCHC 27.7 (L) 31.0 - 37.0 g/dL    RDW 22.9 (H) 11.0 - 15.0 %    RDW-SD 59.6 (H) 35.1 - 46.3 fL    Neutrophil Absolute Prelim 4.43 1.50 - 7.70 x10 (3) uL    Neutroph follow    Thank you for allowing me to participate in the care of Mr. Rodolfo Villasenor MD [facility]  Hematology-Oncology

## 2020-01-27 NOTE — PLAN OF CARE
Patient alert and oriented times 1. Meds given per MAR. Vital signs stable. Daughter at bedside. Kept clean and dry. Seizure precautions. Meds crushed in apple sauce. Bunny boots placed on patient. Resting comfortably in bed. Call light in reach.

## 2020-01-27 NOTE — PROGRESS NOTES
[facility] hospitalist daily note  Patient was seen/examined on 1/27/20    S; denies pain, denies SOB, no nausea/emesis  Daughter at the bedside    Medications in Epic    PE     01/27/20  1630   BP: 114/49   Pulse: 65   Resp: 21   Temp: 98.5 °F (36.9 °C)     Gen: right hemidiaphragm.  Follow up with pulmonology and PCP       PAD, ulcer R first toe  Osteomyelitis of R great toe  -wound eval, podiatry and vascular consulted, apprec  -angio/plasty per vascular   -try to avoid narcotics per family request   ID following

## 2020-01-27 NOTE — PROGRESS NOTES
[facility]  Nephrology Progress Note    Jeniffer Montoya. Attending:  Lizbet Perez MD       Assessment and Plan:    1) ESRD- due to longstanding HTN / DM. Lytes / volume OK.  HD today per usual routine    2) Anemia- due to CKD; on EPO with HD (KEPPRA) tab 250 mg, 250 mg, Oral, PRN Dialysis  docusate sodium (COLACE) liquid 100 mg, 100 mg, Oral, BID  Vancomycin HCl (VANCOCIN) 500 mg in sodium chloride 0.9% 150 mL IVPB, 500 mg, Intravenous, PRN Dialysis  bisacodyl (DULCOLAX) rectal suppository 10

## 2020-01-28 NOTE — DIETARY NOTE
[facility]    NUTRITION ASSESSMENT    Pt does not meet malnutrition criteria.     NUTRITION DIAGNOSIS/PROBLEM:    Increased nutrient needs related to physiologic causes increasing nutrient needs as evidenced by ESRD on HD and diabetic foot ulcer - ongo This is 100 % of IBW  BMI: Body mass index is 21.93 kg/m². IBW: 56.3 kg    WEIGHT HISTORY: Wt appears fairly stable per EMR hx. Suspect wt fluctuations are due to fluid shifts from HD.      Wt Readings from Last 12 Encounters:  01/28/20 : 56.2 kg (123 lb 1

## 2020-01-28 NOTE — PLAN OF CARE
Assumed care 2100. Patient alert and oriented x3-4. MRI completed  Pulses per doppler  NSR on tele  Murmur noted  Patient complaining of mild abdomen pain. Bowels sounds hyperactive  Pain in both feet and right arm.  Unable to follow some of neurological

## 2020-01-28 NOTE — PLAN OF CARE
Assumed care at 0730  Patient alert to self and place  Disoriented to time and situation  Denies pain at rest  Some pain to R great toe with dressing change  Pain to RUE with movement  24 hr EEG started this am  HD ordered and called in for tomorrow  Famil

## 2020-01-28 NOTE — PROGRESS NOTES
Subjective     No overnight events    • docusate sodium  100 mg Oral BID   • Midodrine HCl  10 mg Oral TID   • Clopidogrel Bisulfate  75 mg Oral Daily   • atorvastatin  80 mg Oral Nightly   • aspirin  300 mg Rectal Daily    Or   • aspirin  325 mg Oral Syeda admission  - during these spells, he is nonverbal; it can last several hours  - following the episode, he has no memory of the event  - ddx includes subclinical seizure  - recommend 24 hour EEG monitoring     2.   Right Upper Extremity Weakness  - it has be

## 2020-01-28 NOTE — PLAN OF CARE
Assumed care at 0730  Patient alert and oriented x3  Dialysis today. 400 mL taken off  Held Keppra today per Dr. Mallory Pitts. Will await EEG results. R arm swollen, weak. U/s ordered  Plan for MRI tonight.    Dr. Mallory Pitts ok with continuous EEG being done fuad

## 2020-01-28 NOTE — PROGRESS NOTES
[facility]  Nephrology Progress Note    Nora Miller. Attending:  Char Gudino MD       Assessment and Plan:    1) ESRD- due to longstanding HTN / DM. Lytes / volume OK.  HD We per usual routine    2) Anemia- due to CKD; on EPO with HD    3 Oral, TID  Albumin Human (ALBUMINAR) 25 % solution 100 mL, 100 mL, Intravenous, PRN  PEG 3350 (MIRALAX) powder packet 17 g, 17 g, Oral, Daily PRN  Clopidogrel Bisulfate (PLAVIX) tab 75 mg, 75 mg, Oral, Daily  Albumin Human (ALBUMINAR) 25 % solution 100 mL,

## 2020-01-29 NOTE — PROGRESS NOTES
[facility]  Progress Note    Shalini Pinzon.  Patient Status:  Inpatient    10/18/1940 MRN [id number]   [facility] 7NE-A Attending Marlon Mckeon MD   [facility] Day # 15 PCP Luz Elena Rangel MD     Subjective:    Feeling bet Noemy Hines in clinic    Thank you for allowing me to participate in the care of Mr. Beata Vasquez MD [facility]  Hematology-Oncology

## 2020-01-29 NOTE — PROGRESS NOTES
[facility] Hospitalist Progress Note     [facility]      SUBJECTIVE:  Feeling ok today  States his pain is ok, mostly in right arm and foot  Denies SOB or CP    OBJECTIVE:  Temp:  [98.1 °F (36.7 °C)-98.6 °F (37 °C)] 98.1 °F (36.7 °C)  Pulse:  [64-77] 72  R along the great toe. No radiographic evidence of osteomyelitis. Scattered vascular calcifications. CONCLUSION:  No evidence of acute displaced fracture or dislocation in the right foot.   No significant radiographic evidence to suggest osteomyelitis 1/22/2020 at 11:35     Approved by: Delbert Garcia MD on 1/22/2020 at 11:38          Ct Brain Or Head (76168)    Result Date: 1/12/2020  PROCEDURE:  CT BRAIN OR HEAD (25316)  COMPARISON:  FLAVIO JAMES, CT BRAIN OR HEAD ([id number]), 1/19/2019, 19:13.   INDICATIO MR, MRI BRAIN W O CONT, 3/03/2009, 19:54. INDICATIONS:   TECHNIQUE:  MRI of the brain was performed with multi-planar T1, T2-weighted images with FLAIR sequences and diffusion weighted images without infusion.   PATIENT STATED HISTORY: (As transcribed by T Moderate-to-severe mineralization of the bilateral basal nuclei, and cerebellar dentate nuclei. No MR evidence of hemorrhage. Ventricular system: No hydrocephalus. Mild ventricular enlargement, secondary to extensive white matter volume loss.  Basal cistern basal nuclei, which could represent sequela from underlying hepatorenal disease and manganese deposition. Correlate with liver function tests. 4. NO acute intracranial hemorrhage, midline shift, mass effect or abnormal extra-axial fluid collection.  NO hydr foraminal narrowing. CRANIOCERVICAL AREA:  Normal foramen magnum with no Chiari malformation. PARASPINAL AREA:  Normal with no visible mass. BONY STRUCTURES:  Mild reversal of the cervical lordosis centered at C4-5.   No fracture, pars defect, or osseous Ratio:  1.09                           Left CCA Peak Systolic Velocity:  70 centimeters/second             Left Proximal ICA Peak Systolic Velocity:  71.30 cm/s             Left Mid ICA Peak Systolic Velocity:  58.22 cm/s             Left Distal ICA Peak S 13:00          Mri Brachial Plexus Sh(mjq=[id number])    Result Date: 1/27/2020  PROCEDURE:  MRI BRACHIAL PLEXUS SH(CPT=73218)  COMPARISON:  None.   INDICATIONS:  right arm weakness  TECHNIQUE:  A variety of imaging planes and parameters were utilized for visual disease consistent with chronic small vessel ischemic changes. Dictated by: Angeles Carlos DO on 1/18/2020 at 12:34     Approved by:  Angeles Carlos DO on 1/18/2020 at 12:36          Xr Chest Ap Portable  (cpt=71045)    Result Date: 1/12/2020  PROCEDURE:  X mg, 100 mg, Oral, BID  Vancomycin HCl (VANCOCIN) 500 mg in sodium chloride 0.9% 150 mL IVPB, 500 mg, Intravenous, PRN Dialysis  bisacodyl (DULCOLAX) rectal suppository 10 mg, 10 mg, Rectal, Daily PRN  Midodrine HCl (PROAMATINE) tab 10 mg, 10 mg, Oral, TID myositis in the past, had seen rheumatology  - Patient stating it is feeling better today, although seems to be poor historian  - Tried calling daughter to discuss, no answer.  Voicemail left  - tylenol prn, avoiding opiates with mental status issues  - Apr

## 2020-01-29 NOTE — PHYSICAL THERAPY NOTE
PHYSICAL THERAPY TREATMENT NOTE - INPATIENT    Room Number: 3090/2946-J     Session: 5  Number of Visits to Meet Established Goals: 5    Presenting Problem: hypotension, PVD with R toe infection, s/p angioplasty RLE   69 yo admitted with R facial droop an 5/2/2019    Performed by Charlee Goff MD at [facility] ENDOSCOPY   • ESOPHAGOGASTRODUODENOSCOPY (EGD) N/A 5/1/2019    Performed by Charlee Goff MD at [facility] ENDOSCOPY   • OTHER SURGICAL HISTORY      placement of fistula for dialysis   • UPPER GI ENDOSCOPY,EXAM Device: Rolling walker  Pattern: Comment(N/A)  Stoop/Curb Assistance: Not tested  Comment : above scores based on dept protocol    Skilled Therapy Provided:     Pt presented in supine upon PT arrival. Pt willing to participate in session, working towards i with assist x 2. Attempted use of sit<>stand lift equipment, although unable to safely attach sling d/t Pt's extreme retropulsion. Pt remains total lift for OOB mobility. RN and SW/CM updated after session.      At this time, Pt. presents with decreased

## 2020-01-29 NOTE — PLAN OF CARE
Pt A&O x2. Disoriented to time and situation. Dialyzed today. No fluid off. Pt worked with pt, recommending EM or ambulance for pt transport home. Family refusing.  Want to take pt home in own car  Toe dressing changed  EEG neg  US or RUE neg  Neuro and I

## 2020-01-29 NOTE — PROGRESS NOTES
[facility]  Nephrology Progress Note    Carline Damon. Attending:  Flaco Pizano MD       Assessment and Plan:    1) ESRD- due to longstanding HTN / DM. Lytes / volume OK.  HD today per usual routine    2) Anemia- due to CKD; on EPO with HD mg, 100 mg, Oral, BID  Vancomycin HCl (VANCOCIN) 500 mg in sodium chloride 0.9% 150 mL IVPB, 500 mg, Intravenous, PRN Dialysis  bisacodyl (DULCOLAX) rectal suppository 10 mg, 10 mg, Rectal, Daily PRN  Midodrine HCl (PROAMATINE) tab 10 mg, 10 mg, Oral, TID

## 2020-01-29 NOTE — CM/SW NOTE
Met with pts daughter - she is ready to take pt home once he is medically cleared. IM has been delivered. She believes she states she has everything set up at home for his care and will transport him to HD.  They will provide private transportation from the

## 2020-01-29 NOTE — PROGRESS NOTES
Subjective     No overnight events    • epoetin marquise-epbx  10,000 Units Intravenous Once in dialysis   • Heparin Sodium (Porcine)  5,000 Units Subcutaneous 2 times per day   • docusate sodium  100 mg Oral BID   • Midodrine HCl  10 mg Oral TID   • Clopidogr stenosis; there was a cervical radiculopathy affecting the right C4 nerve root; unlikely to be causing his diffuse right arm pain  - MRI Brachial Plexus is unremarkable  - agree with ultrasound of his right arm given edema     Gabby Ellison MD

## 2020-01-29 NOTE — PLAN OF CARE
Assumed care of pt at 1900. Pt AOx2. Pt no c/o pain. Dressing to right great toe CDI. 24 hour EEG intact and to keep on till tomorrow at 11am.  Pt to have HD in am.  Family at bedside. Seizure precautions. Isolation protocol.   Right are elevated on p

## 2020-01-29 NOTE — CM/SW NOTE
Care Progression Note:  Active Acute Medical Issue: ESRD (end stage renal disease) ([facility] [address]   Other Contributing Medical Factors/Dx.: MRI negative for anything acute. 24 hour EEG ends at 1100 am. RUE u/s pending.    Length of stay: 13  Avoidable Delays: n/a

## 2020-01-29 NOTE — PROGRESS NOTES
INFECTIOUS DISEASE PROGRESS NOTE    Burak Hester.  Patient Status:  Observation    10/18/1940 MRN [id number]   [facility] 3NE-A Attending Diogo Love MD   [facility] Day # aspirin 300 MG rectal suppository 300 mg, 300 mg, Rectal, Daily **OR** aspirin tab 325 mg, 325 mg, Oral, Daily  •  amiodarone HCl (PACERONE) tab 200 mg, 200 mg, Oral, Nightly  •  Calcium Carbonate Antacid (TUMS) chewable tab 500 mg, 500 mg, Oral, Daily PRN No Growth 5 Days N/A   2.  AEROBIC BACTERIAL CULTURE     Status: Abnormal    Collection Time: 01/20/20 11:28 AM   Result Value Ref Range    Aerobic Culture Result 3+ growth Staphylococcus aureus, MRSA (A) N/A    Aerobic Smear No WBCs seen N/A    Aerobic Sme

## 2020-01-29 NOTE — PROGRESS NOTES
INFECTIOUS DISEASE PROGRESS NOTE    Mabel Zuniga.  Patient Status:  Observation    10/18/1940 MRN [id number]   [facility] 3NE-A Attending Kimmy Campos MD   [address] [facility] Day # 5,000 Units, 5,000 Units, Subcutaneous, Q8H [facility]  •  amiodarone HCl (PACERONE) tab 200 mg, 200 mg, Oral, Nightly  •  Calcium Carbonate Antacid (TUMS) chewable tab 500 mg, 500 mg, Oral, Daily PRN  •  Vitamin D3 cap 2,000 Units, 2,000 Units, Oral, Daily  •  f Abnormal    Collection Time: 01/20/20 11:28 AM   Result Value Ref Range    Aerobic Culture Result 3+ growth Staphylococcus aureus, MRSA (A) N/A    Aerobic Smear No WBCs seen N/A    Aerobic Smear 1+ Gram positive cocci in clusters N/A       Susceptibility

## 2020-01-29 NOTE — PROGRESS NOTES
[facility] hospitalist daily note  Patient was seen/examined on 1/28/20     S; denies pain, denies SOB, no nausea/emesis  No pain     Medications in Epic     PE.   01/28/20  1630   BP: 125/46   Pulse: 66   Resp: 19   Temp: 98.2 °F (36.8 °C)     Gen: awake, alert, -echo EF 55-60%     ESRD d/t DM/HTN  -renal following, HD MWF  -HD per renal     Hypoxia resolved. Pt is on room air, O2 sat 95%  Pt with hx of ARMAS due to Pulm HTN, deconditioning, chronically elevated right hemidiaphragm.  Follow up with pulmonology and

## 2020-01-29 NOTE — OCCUPATIONAL THERAPY NOTE
OCCUPATIONAL THERAPY TREATMENT NOTE - INPATIENT     Room Number: 6260/1844-V  Session: 1/3   Number of Visits to Meet Established Goals: 3;Trial    Presenting Problem: R great toe cellulitis, hypotension, ataxia, AMS, ESRD     History related to current ad transfusions this year in 2019   • HTN    • Hyperlipemia    • Muscle weakness     both legs   • Neuropathy     feet    • NIDDM (non-insulin dependent diabetes mellitus)    • Pneumonia due to organism 2018   • Renal disorder    • Renal failure (ARF), acute TRANSFER ASSESSMENT  Supine to Sit : Maximum assistance  Sit to Stand: Not tested    Skilled Therapy Provided: Pt received supine in bed. Pt completed bed mobility supine <> sit MAX A x2 utilizing bed railing.  Pt engaged in static and dynamic sitting and r level and would benefit from skilled inpatient OT to address the above deficits, maximizing the patient's ability to return safely to prior level of function.  Current recommendation of EM 13-15 days continues to be appropriate at this time as patient cont

## 2020-01-30 NOTE — PROGRESS NOTES
Subjective     No overnight events    • levETIRAcetam  250 mg Oral BID   • Heparin Sodium (Porcine)  5,000 Units Subcutaneous 2 times per day   • docusate sodium  100 mg Oral BID   • Midodrine HCl  10 mg Oral TID   • Clopidogrel Bisulfate  75 mg Oral Daily radiculopathy affecting the right C4 nerve root; unlikely to be causing his diffuse right arm pain  - MRI Brachial Plexus is unremarkable  - ultrasound negative  - consider EMG/NCV as an outpatient    Okay for discharge    F/u with Dr. Noemy Berrios in 1-2 week

## 2020-01-30 NOTE — PROGRESS NOTES
[facility]  Progress Note    Jeniffer Montoya.  Patient Status:  Inpatient    10/18/1940 MRN [id number]   [facility] 7NE-A Attending Lucio Howard MD   [facility] Day # 14 PCP Itzel Correa MD     Subjective:    Feeling bet 5.80 x10(6)uL    HGB 7.7 (L) 13.0 - 17.5 g/dL    HCT 27.6 (L) 39.0 - 53.0 %    .0 (H) 150.0 - 450.0 10(3)uL    MCV 73.8 (L) 80.0 - 100.0 fL    MCH 20.6 (L) 26.0 - 34.0 pg    MCHC 27.9 (L) 31.0 - 37.0 g/dL    RDW 21.9 (H) 11.0 - 15.0 %    RDW-SD 56. 0 ASSESSMENT AND PLAN:       Pepe Leonard. is a 70year old male with anemia from ESRD and low Iron.    Has history of AVM  Hemoglobin is 8+ with low iron saturation   I will recommend IV iron weekly x 5 and then reassessment     Thrombocyto

## 2020-01-30 NOTE — DISCHARGE SUMMARY
General Medicine Discharge Summary     Patient ID:  Marti Briggs.  70year old  10/18/1940    Admit date: 1/12/2020    Discharge date and time: 1/30/2020  2:25 PM     Attending Physician: Fercho Jacobsno MD    Primary Care Physician: Zeb Berkowitz neurology evaluation  - MRI neck with some cervical stenosis -- not suspected to be etiology of pain  - MRI brachial plexus unremarkable  - PT/OT worked with today -- had good strength bilaterally in UE on evaluation, and better in LE     # Thrombocytosis Discharge Medication List as of 1/30/2020 12:45 PM    START taking these medications    atorvastatin 80 MG Oral Tab  Take 1 tablet (80 mg total) by mouth nightly., Historical, Disp-30 tablet, R-0    Midodrine HCl 10 MG Oral Tab  Take 1 tablet (10 mg tota minutes    Patient had opportunity to ask questions and state understand and agree with therapeutic plan as outlined above.      Thank Tayla Warren MD

## 2020-01-30 NOTE — PLAN OF CARE
Assumed care at 1. Pt is pleasantly confused. Daughter at bedside. Denies pain or discomfort. Due meds given crushed with apple sauce. Pt tolerated well. Still poor appetite. Calorie count started. Seizure and fall precautions. Possible d/c today.

## 2020-01-30 NOTE — PROGRESS NOTES
[facility]  Nephrology Progress Note    Juan Brock. Attending:  Belgica Monzon MD       Assessment and Plan:    1) ESRD- due to longstanding HTN / DM. Lytes / volume OK.  Next HD Friday per usual routine    2) Anemia- due to CKD; on EPO wit 01/30/2020    BILT 0.6 01/30/2020    TP 8.4 01/30/2020     01/30/2020    ALT 43 01/30/2020    CRP 11.10 01/30/2020     01/30/2020       Imaging: All imaging studies reviewed.     Meds:   levETIRAcetam (KEPPRA) tab 250 mg, 250 mg, Oral, BID  He Jimena  1/30/2020  1049 AM

## 2020-01-30 NOTE — CM/SW NOTE
Pt is ready for d/c today. Pt will go home with [facility]. Galileo Krishnamurthy from [facility] aware of pt's d/c today. Dtr will drive pt home.

## 2020-01-30 NOTE — PROGRESS NOTES
INFECTIOUS DISEASE PROGRESS NOTE    Marti Briggs.  Patient Status:  Observation    10/18/1940 MRN [id number]   [facility] 3NE-A Attending Victorina Benavides MD   [facility] Day # (PACERONE) tab 200 mg, 200 mg, Oral, Nightly  •  Calcium Carbonate Antacid (TUMS) chewable tab 500 mg, 500 mg, Oral, Daily PRN  •  Vitamin D3 cap 2,000 Units, 2,000 Units, Oral, Daily  •  folic acid (FOLVITE) tab 1 mg, 1 mg, Oral, Daily  •  Pantoprazole So Growth 5 Days N/A   2.  AEROBIC BACTERIAL CULTURE     Status: Abnormal    Collection Time: 01/20/20 11:28 AM   Result Value Ref Range    Aerobic Culture Result 3+ growth Staphylococcus aureus, MRSA (A) N/A    Aerobic Smear No WBCs seen N/A    Aerobic Smear

## 2020-01-30 NOTE — PROGRESS NOTES
NURSING DISCHARGE NOTE    Discharged Home via Wheelchair. Accompanied by Family member and RN  Belongings Taken by patient/family. Pt discharge to home with Home Health per MD order.   Discharge instructions, medication education, prescription,   an

## 2020-01-30 NOTE — CM/SW NOTE
01/30/20 1300   Discharge disposition   Expected discharge disposition Home-Health   Name of Facillity/Home Care/Hospice Residential   Additional Home Care/Hospice Provider   ([facility] [place])   Outpatient services Palliative   Home services after discha

## 2020-01-30 NOTE — PLAN OF CARE
Assumed care @ 0700. Pt a/o x2, VSS. Tele SR. No acute respiratory distress noted. Denies any pain. Pt rested in bed. (-) BM. Plan for discharge to home with [facility] today. Rt toe dressing changed. Plan of care reviewed with pt's family at bedside. level of independence in self care  Description  Interventions:  - Assess ability and encourage patient to participate in ADLs to maximize function  - Promote sitting position while performing ADLs such as feeding, grooming, and bathing  - Educate and enco

## 2020-01-30 NOTE — DIETARY NOTE
Calorie Count    1/29 B - 182 kcals, 2g pro         L  - 419 kcals, 16.5g pro         D  - 560 kcals, 30g pro  Total Intake: 1161kcals (68% estimated needs) , 48.5g pro (71% estimated needs)    1/30 B - 175 kcals, 10g pro        NUTRITION PRESCRIPTION: 56.

## 2020-02-06 PROBLEM — R65.20 SEPSIS WITH ACUTE ORGAN DYSFUNCTION, DUE TO UNSPECIFIED ORGANISM, UNSPECIFIED TYPE, UNSPECIFIED WHETHER SEPTIC SHOCK PRESENT (HCC): Status: ACTIVE | Noted: 2020-01-01

## 2020-02-06 PROBLEM — I96 NECROTIC TOES (HCC): Status: ACTIVE | Noted: 2020-01-01

## 2020-02-06 PROBLEM — D72.829 LEUKOCYTOSIS, UNSPECIFIED TYPE: Status: ACTIVE | Noted: 2020-01-01

## 2020-02-06 PROBLEM — D72.829 LEUKOCYTOSIS: Status: ACTIVE | Noted: 2020-01-01

## 2020-02-06 PROBLEM — A41.9 SEPSIS WITH ACUTE ORGAN DYSFUNCTION, DUE TO UNSPECIFIED ORGANISM, UNSPECIFIED TYPE, UNSPECIFIED WHETHER SEPTIC SHOCK PRESENT (HCC): Status: ACTIVE | Noted: 2020-01-01

## 2020-02-06 NOTE — ED INITIAL ASSESSMENT (HPI)
Patient recently discharged from here on Thursday after being treated for right great big toe cellulitis, and stroke like symptoms. Patient here today for pain to right great toe, vomiting, fever, lethargy.

## 2020-02-06 NOTE — ED PROVIDER NOTES
Patient Seen in: [facility] Emergency Department      History   Patient presents with:  Cellulitis    Stated Complaint: possible infected toe    HPI  69 yo male with history of SVT, DM, dialysis MWF- last had it yesterday, with right upper arm fistu • COLECTOMY     • COLONOSCOPY N/A 5/2/2019    Performed by Shannan Hall MD at [facility] ENDOSCOPY   • ESOPHAGOGASTRODUODENOSCOPY (EGD) N/A 5/1/2019    Performed by Shannan Hall MD at [facility] ENDOSCOPY   • OTHER SURGICAL HISTORY      placement of fistula for dial Comments: Right upper arm fistula. Right great toe wound with bloody drainage and dark area to posterior aspect. See photo. DP and PT pulse located with doppler to right foot. Skin:     General: Skin is warm and dry.       Capillary Refill: Capillar Neutrophil Absolute 16.30 (*)     Lymphocyte Absolute 0.81 (*)     Monocyte Absolute 1.04 (*)     All other components within normal limits   CBC WITH DIFFERENTIAL WITH PLATELET    Narrative:      The following orders were created for panel order CBC WITH PROCEDURE:  XR CHEST AP PORTABLE  (CPT=71045)  TECHNIQUE:  AP chest radiograph was obtained. COMPARISON:  [facility] , XR, XR CHEST AP PORTABLE  (CPT=71045), 1/12/2020, 16:02.   INDICATIONS:  cough  PATIENT STATED HISTORY: (As transcribed by Technologist)  Heydi Pratt Elevated lactic acid. Pt kidney failure on dialysis. 500 cc bolus given of NS at this time with repeat 500 cc. Full bolus not given per attending r/t kidney failure. Attending agrees.         Disposition and Plan     Clinical Impression:  Necrotic toes

## 2020-02-07 NOTE — CONSULTS
[facility]  Patient Status:  Inpatient    10/18/1940 MRN [id number]   [facility] 4SW-A Attending Yudelka Dutta MD   Hosp Day # 1 PCP Andres Roberts MD     Date of Admission: 2020  Admission Diag Neuropathy     feet    • NIDDM (non-insulin dependent diabetes mellitus)    • Pneumonia due to organism 2018   • Renal disorder    • Renal failure (ARF), acute on chronic [facility])    • [facility]    • Stroke [facility]) 2009   • TIA (transient ischemic Release, Take 1 capsule (20 mg total) by mouth daily. , Disp: 90 capsule, Rfl: 3  acetaminophen 500 MG Oral Tab, Take 500 mg by mouth every 6 (six) hours as needed for Pain.  , Disp: , Rfl:   rivastigmine 9.5 MG/24HR Transdermal Patch 24 Hr, Place 1 patch o Settings: 3L      Wt Readings from Last 3 Encounters:  02/06/20 : 119 lb 0.8 oz (54 kg)  02/06/20 : 119 lb 14.9 oz (54.4 kg)  01/30/20 : 128 lb 1.4 oz (58.1 kg)       No intake/output data recorded.   I/O this shift:  In: 1456.6 [I.V.:1256.6; IV PIGGYBACK:2 lactate  2. PVD:  -vascular and podiatry following, no plans for surgical intervention  3. ID: right great toe osteomyelitis  -Abx per ID   4. ESRD:  -HD per renal  5. Anemia: 2/2 chronic disease  -monitor  6.  HTN:  -resume antihypertensives as BP tolerate

## 2020-02-07 NOTE — CONSULTS
Brief consult note, full note to follow. Pt seen at bedside. Loose right hallux nail removed in total.  Betadine dressing applied. No surgical plan at this time. right hallux appears stable, though ischemic appearing.      Umang Silveira DPM

## 2020-02-07 NOTE — CONSULTS
[facility]  Report of Consultation    Yuliya Nieves.  Patient Status:  Inpatient    10/18/1940 MRN [id number]   [facility] 4SW-A Attending Jc Bradford MD   Hosp Day # 1 PCP Ade Dimas MD     [facility], [facility] ENDOSCOPY   • OTHER SURGICAL HISTORY      placement of fistula for dialysis   • UPPER GI ENDOSCOPY,EXAM       Family History   Problem Relation Age of Onset   • Hypertension Father       reports that he has never smoked.  He has never used smokeless tobacco at 2/7/2020 0600  Gross per 24 hour   Intake 1456.6 ml   Output —   Net 1456.6 ml     Last 3 Weights  02/06/20 2223 : 119 lb 0.8 oz (54 kg)  02/06/20 1648 : 120 lb (54.4 kg)  02/06/20 1952 : 119 lb 14.9 oz (54.4 kg)  01/30/20 0526 : 128 lb 1.4 oz (58.1 kg) (mg/dL)   Date Value   02/07/2020 38 (H)   02/06/2020 32 (H)   01/30/2020 18   03/15/2011 60 (H)   03/14/2011 89 (H)   03/13/2011 63 (H)     Blood Urea Nitrogen (mg/dL)   Date Value   10/06/2018 10   01/09/2018 15   08/14/2013 23     Creatinine, Serum (mg/ was a reasonable approach but we will follow her wishes. Will cont to have this discussion    Thank you for allowing me to participate in the care of your patient. Please do not hesitate to call with any questions or concerns.        Trisha Rodriguez  2/7/2

## 2020-02-07 NOTE — DIETARY MALNUTRITION NOTE
[facility]    NUTRITION INITIAL ASSESSMENT    Pt meets moderate malnutrition criteria.     CRITERIA FOR MALNUTRITION DIAGNOSIS:  Criteria for non-severe malnutrition diagnosis: chronic illness related to energy intake less than75% for greater than 1 mo Poor  Intake: 0%  Intake Meeting Needs: No, but supplements to maximize  Food Allergies: No  Cultural/Ethnic/Zoroastrianism Preferences Addresses: Yes    NUTRITION RELATED PHYSICAL FINDINGS:     1. Body Fat/Muscle Mass: mild depletion body fat orbital region an

## 2020-02-07 NOTE — PLAN OF CARE
Assumed care around [phone] [id number]; pt brought up from ED; moved onto ICU bed. Multiple wounds; cleansed/photographed and pictures placed in chart. Pt is A&Ox2; follow commands. Pain in feet; CC ANAID Munguia @BS; morphine given. Afebrile. SR on monitor; BP stable.  3L

## 2020-02-07 NOTE — CONSULTS
[facility] Cardiology  Consultation Note      Nora Miller.  Patient Status:  Inpatient    10/18/1940 MRN [id number]   [facility] 4SW-A Attending Yudelka Dutta MD   Hosp Day # 1 PCP Andres Roberts MD     Outpat lactic acid; admitted to ICU for modified sepsis protocol. Toe was evaluated by Dr. Denise Zarco today who assessed toe to appear stable without plans for further surgery/revascularization, nail removed by podiatry.       BP has been stable since admission, yasir blood pressure    • High cholesterol    • History of blood transfusion 06/2019    6 total blood transfusions this year in 2019   • HTN    • Hyperlipemia    • Muscle weakness     both legs   • Neuropathy     feet    • NIDDM (non-insulin dependent diabetes m lb 14.9 oz (54.4 kg)  01/30/20 : 128 lb 1.4 oz (58.1 kg)      General: minimally responsive, baseline dementia.   HEENT: Extraocular movements are intact; sclerae are anicteric; scalp is atrauamatic; no thyromegaly  Neck: Supple; no JVD; no carotid bruits

## 2020-02-07 NOTE — SLP NOTE
Order received per RN dysphagia screen due to Pt was on altered diet consistency at home. Dtr reported ground/chopped diet level at home with thin liquids, however PO intake fluctuated due to waxing/waning mental status.   Pt currently in ICU and s/p RN adm

## 2020-02-07 NOTE — SLP NOTE
Attempted to see patient as increased responsiveness noted and reported per Dtr. Pt with eyes open initially however difficult to sustain adequate alertness.   Pt appeared to be in drowsy state and unable to complete oral motor commands or initiate swallow

## 2020-02-07 NOTE — H&P
General Medicine H&P     Patient presents with:  Cellulitis       PCP: Kat Araya MD    History of Present Illness: Patient is a 70year old male with PMH including but not limited to SVT, ESRD on HD, DVT, GIB, HTN, HL, pHTN, who p/t [facility] ED c toe in dialysis   • UPPER GI ENDOSCOPY,EXAM          ALL:  No Known Allergies     [START ON 2/8/2020] amiodarone HCl, 200 mg, Daily  atorvastatin, 80 mg, Nightly  levETIRAcetam, 250 mg, BID  Midodrine HCl, 10 mg, TID  rivastigmine, 1 patch, Daily  folic acid, 1 m (CPT=73630)  TECHNIQUE:  AP, oblique, and lateral views were obtained. COMPARISON:  None. INDICATIONS:  Right great toe wound. PATIENT STATED HISTORY: (As transcribed by Technologist)    FINDINGS:  No evidence of acute displaced fracture or dislocation. ([facility]) NRDR ([address]) which includes the Dose Index Registry.   PATIENT STATED HISTORY: (As transcribed by Technologist)  AMS   FINDINGS: Stable moderate global brain parenchymal volume loss without overt hy calcifications within the basal ganglia bilaterally extending into the perivascular spaces. Similar appearing dense calcifications within the dentate nuclei of the cerebellum bilaterally.   Moderate atrophy and periventricular/subcortical white matter dise renal disease with atrophied echogenic kidneys with numerous cysts. No hydronephrosis or other acute process. Hepatic echogenicity normal, no sign of hepatic mass or ascites.   Gallstones and sludge without signs of biliary dilation or ultrasound features services. 1.  Dictated by: Robb Nesbitt MD on 1/13/2020 at 7:18     Approved by: Robb Nesbitt MD on 1/13/2020 at 7:23          Mri Spine Cervical (cpt=72141)    Result Date: 1/27/2020  PROCEDURE:  MRI SPINE CERVICAL (CPT=72141)  COMPARISON:  None.   INDICATIONS BONY STRUCTURES:  Mild reversal of the cervical lordosis centered at C4-5. No fracture, pars defect, or osseous lesion. CORD:  Normal caliber, contour, and signal intensity. CONCLUSION:   1.   C3-4 moderate degenerative disc bulge eccentric to the r Velocity:  75.79 cm/s             Left Mid ICA Peak Systolic Velocity:  34.84 cm/s             Left Distal ICA Peak Systolic Velocity:  97.53 cm/s             Left ICA/CCA Velocity Ratio:  1.25  The vertebral arteries demonstrate antegrade flow.       CONCL INDICATIONS:  right arm weakness  TECHNIQUE:  A variety of imaging planes and parameters were utilized for visualization of suspected pathology. PATIENT STATED HISTORY: (As transcribed by Technologist)  Right upper extremity weakness.     FINDINGS:   Th DO on 1/18/2020 at 12:36          Xr Chest Ap Portable  (cpt=71045)    PROCEDURE:  XR CHEST AP PORTABLE  (CPT=71045)  TECHNIQUE:  AP chest radiograph was obtained. COMPARISON:  [facility] , XR, XR CHEST AP PORTABLE  (CPT=71045), 1/12/2020, 16:02.   INDICATIONS PLAN:     70year old male with PMH including but not limited to SVT, ESRD on HD, DVT, GIB, HTN, HL, pHTN, who p/t [facility] ED c toe infection.      Sepsis 2/2 R toe infection, ulcer/gangrene  -IVF, vasopressors PRN  -ID following, IV vanco and zosyn  -blood/woun

## 2020-02-07 NOTE — ED PROVIDER NOTES
Patient Seen in: [facility] Emergency Department      History   Patient presents with:  Cellulitis    Stated Complaint: possible infected toe    HPI    This is a 60-year-old male with extensive past medical history including end-stage renal disease total blood transfusions this year in 2019   • HTN    • Hyperlipemia    • Muscle weakness     both legs   • Neuropathy     feet    • NIDDM (non-insulin dependent diabetes mellitus)    • Pneumonia due to organism 2018   • Renal disorder    • Renal failure ( sounds. No rebound. No guarding. : Right inguinal hernia noted  EXTREMITIES: See photos in PA note. Right great toe is necrotic. Signal by Doppler for DP PT pulses.       ED Course     Labs Reviewed   COMP METABOLIC PANEL (14) - Abnormal; Notable for created for panel order CBC WITH DIFFERENTIAL WITH PLATELET.   Procedure                               Abnormality         Status                     ---------                               -----------         ------                     CBC W/ DIFFERENTIAL[ phos 199. Lactic acid 3.2. Procalcitonin 4.31. Right great toe x-ray was suspicious for osteomyelitis. Chest x-ray showed no consolidation. IV Zosyn and Shirley Bodily was given after discussing case with infectious disease Dr. Sanjana Ken.   Patient was vomit Leukocytosis, unspecified type D72.829 2/6/2020     Sepsis with acute organ dysfunction, due to unspecified organism, unspecified type, unspecified whether septic shock present ([facility] A41.9, R65.20 2/6/2020

## 2020-02-07 NOTE — ED NOTES
Daughter to nurses station to notify RN that patient vomited up tylenol. MD and NP notified, MRI to not be completed tonight.

## 2020-02-07 NOTE — CM/SW NOTE
Order noted for informational meeting with [facility] for open access vs hospice. Referral sent to [facility] via [address]. Await response.     Demar Perdomo  [address]  [phone]  C:[phone] How Severe Is The Scalp Condition?: mild Additional History: Was using Betamethasone in 2016 and now is using Fluocininide\\nHasn’t used fluocinonide in about 1 month \\nNo new hair loss \\nDenies any burning in scalp, occasional mild itching

## 2020-02-07 NOTE — PROGRESS NOTES
[facility] Pharmacy Note:     Burak Bedoya is a 70year old male and meets criteria for the initiation of PPI since he was taking omeprazole 20mg PO daily PTA. Ordered pantoprazole 20mg PO daily per pharmacy protocol.     Thank you,  Jessica Rodriguez, [facility]

## 2020-02-07 NOTE — CONSULTS
[address] Initial Consult    Itzel Kuhn.  Patient Status:  Inpatient    10/18/1940 MRN [id number]   [facility] 4SW-A Attending Kat Bolanos MD   Hosp Day # 1 PCP Quin Hernandez MD     Date of Co blood transfusion 06/2019    6 total blood transfusions this year in 2019   • HTN    • Hyperlipemia    • Muscle weakness     both legs   • Neuropathy     feet    • NIDDM (non-insulin dependent diabetes mellitus)    • Pneumonia due to organism 2018   • Stefani MG/24HR patch 1 patch, 1 patch, Transdermal, Daily  •  LORazepam (ATIVAN) tab 0.5 mg, 0.5 mg, Oral, QID PRN  •  folic acid (FOLVITE) tab 1 mg, 1 mg, Oral, Daily  •  norepinephrine (LEVOPHED) 4 mg/250 ml premix infusion, 0.5-30 mcg/min, Intravenous, Continu Lab Results   Component Value Date    PT 24.9 (H) 03/15/2011    INR 1.26 (H) 01/12/2020    PTT 38.4 (H) 01/12/2020       Chemistry:  Lab Results   Component Value Date    CREATSERUM 5.68 (H) 02/07/2020    BUN 38 (H) 02/07/2020     02/07/2020    K 3 Xr Chest Ap Portable  (cpt=71045)    Result Date: 2/6/2020  PROCEDURE:  XR CHEST AP PORTABLE  (CPT=71045)  TECHNIQUE:  AP chest radiograph was obtained. COMPARISON:  ERIKA , XR, XR CHEST AP PORTABLE  (CPT=71045), 1/12/2020, 16:02.   INDICATIONS:  coug disease  Can't perform hobby Occasional  Assist Normal or   Reduced Full or confused   50 Mainly sit/lie Extensive Disease  Can't do any work Partial Assist Normal or Reduced Full or confused   40 Mainly in bed Extensive Disease  Can't do any work Mainly A expected to provide her with information abotu which direction to move in with regard to his care. If he does tolerate dialysis, they will want to take pt home and continue HD and abx while also keeping him comfortable.     Hopes/Goals:   Again, as goal is vs Hospice services. Advance Care Planning Counseling/Discussion:     Code Status:  DNR, DNI with Selective treatment. POLST is on file. HCPOA:  Document is on file.   Healthcare Agent Appointed: Yes  Healthcare Agent's Name: Noy Pompa (end stage renal disease) on dialysis [facility])        Palliative Care Recommendations/Plan:       Symptom management:  • DC'd IV Morphine in setting of ESRD. Recommend Acetaminophen as first line for pain if able to take PO.    • Added Hydromorphone 0.1mg IVP

## 2020-02-07 NOTE — CONSULTS
[facility]  Vascular Surgery Consultation    Ciro Banana.  Patient Status:  Inpatient    10/18/1940 MRN [id number]   [facility] 4SW-A Attending Christiano Wheeler MD   Hosp Day # 1 PCP Angeline Fair MD     Reason for C • COLECTOMY     • COLONOSCOPY N/A 5/2/2019    Performed by Kasia Cardoza MD at [address] [place] [address] ENDOSCOPY   • ESOPHAGOGASTRODUODENOSCOPY (EGD) N/A 5/1/2019    Performed by Kasia Cardoza MD at [address] [place] [address] ENDOSCOPY   • OTHER SURGICAL HISTORY      placement of fistula for [facility] swelling  NEURO/EYES: denies headaches, passing out, motor dysfunction, difficulty walking, difficulty with speech, temporary blindness, double vision, confusion    Physical Exam:  BP 98/32   Pulse 86   Temp 97.9 °F (36.6 °C)   Resp 16   Ht 5' 4\" (1.626 m reasonable to consider reconsulting palliative care. Daughter is open to the possibility of stopping dialysis for hospice purposes. Thank you for allowing me to participate in the care of your patient.     Michelle Kern MD  2/7/2020  8:44 AM

## 2020-02-07 NOTE — CONSULTS
INFECTIOUS DISEASE CONSULT NOTE    Reese Hitchcock.  Patient Status:  Inpatient    10/18/1940 MRN [id number]   [facility] 4SW-A Attending Caroline Michaels MD   [facility] Day # 1 P s/p APC hemostasis and clip placement   • High blood pressure    • High cholesterol    • History of blood transfusion 06/2019    6 total blood transfusions this year in 2019   • HTN    • Hyperlipemia    • Muscle weakness     both legs   • Neuropathy     fe Continuous  •  Pantoprazole Sodium (PROTONIX) EC tab 20 mg, 20 mg, Oral, QAM AC  •  [START ON 2/8/2020] epoetin marquise-epbx (RETACRIT) 26524 UNIT/ML injection 10,000 Units, 10,000 Units, Intravenous, Once in dialysis  •  Albumin Human (ALBUMINAR) 25 % carine rhythm. +RAYNE  Abdomen: Soft, nontender, nondistended. Positive bowel sounds. Musculoskeletal: Full range of motion of all extremities. No arthritis  Integument: R hallux with small wound distally, no drainage.  toe with dark discoloration, no smell or d 02/06/20 1900 Impression: PROCEDURE: XR TOE(S) (MIN 2 VIEWS), RIGHT 1ST (CPT=73660)    TECHNIQUE: Three views were obtained. COMPARISON: None.     INDICATIONS: possible infected toe    PATIENT STATED HISTORY: (As transcribed by Technologist) Patient rece intermittent MS changes during a prolonged recent admission, w/o much improvement in spite of extensive w/u. He likely has underlying dementia. All these other issues will cont to be a problem even after toe is amputated.  This was d/w daughter at length an

## 2020-02-07 NOTE — PROGRESS NOTES
[facility] Pharmacy Note:  Renal Adjustment for ceftazidime    Kaveh Mccullough. is a 70year old male who has been prescribed ceftazidime 2000 mg every 8 hrs. CrCl is estimated creatinine clearance is 8.9 mL/min (A) (based on SCr of 5.34 mg/dL (H)).  so th

## 2020-02-07 NOTE — PROGRESS NOTES
Pharmacy Dosing Service  Initial Pharmacokinetic Consult for Vancomycin Dosing          Ana Maria Anderson. is a 70year old male who is being treated for sepsis 2/2 osteomyelitis (great R toe) vs HD catheter-related bloodstream infection.       Pharm 15-20 ug/mL. Pharmacy will continue to follow him and adjust dosing as appropriate.         Miguel Ángel JewellD, BCPS  2/7/2020  2:51 PM  [address] [place] [address] Extension: [phone]

## 2020-02-07 NOTE — PROGRESS NOTES
ICU  Critical Care APRN Progress Note    NAME: Steve Wiley. - ROOM: [address] [place] [address] - MRN: [id number] - Age: 70year old - :10/18/1940    History Of Present Illness:  Steve Wiley. 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 Anticoagulation has been stopped.    • Dementia ([facility]    • Diabetes ([facility]    • Dialysis patient [facility])     Dialysis MWF   • DVT     mult failed av fistulas/DVT   • Esophageal reflux    • ESRD    • GI bleed     -GI s/p small bowel e bruits  Lungs: Clear to auscultation bilaterally, posterior diminished, respirations unlabored  Heart: Regular rate and rhythm, S1 and S2 normal, + murmur, no rub or gallop  Abdomen: Soft, non-tender, bowel sounds active all four quadrants, no masses, no o radiograph was obtained. COMPARISON:  [facility] , XR, XR CHEST AP PORTABLE  (CPT=71045), 1/12/2020, 16:02.   INDICATIONS:  cough  PATIENT STATED HISTORY: (As transcribed by Technologist)  Patient recently discharged from here on Thursday after being treated f 2L of IVF on Monday with HD  -Nephrology consulted   -Fluid management per them  -Anticipate HD tomorrow after surgery or on Saturday  -Avoid nephrotoxic agents    4.   Chronic anemia--likely 2/2 ESRD, also noted to have low iron saturation during last stay

## 2020-02-07 NOTE — CONSULTS
[facility]  Report of Consultation    Jesse Ohara.  Patient Status:  Inpatient    10/18/1940 MRN [id number]   [facility] 4SW-A Attending Isabella Bills MD   Hosp Day # 1 PCP Gissel Lu MD     Date of Admission: legs   • Neuropathy     feet    • NIDDM (non-insulin dependent diabetes mellitus)    • Pneumonia due to organism 2018   • Renal disorder    • Renal failure (ARF), acute on chronic [facility])    • [facility]    • Stroke [facility]) 2009   • TIA (transient ADD-vantage, 3.375 g, Intravenous, Q12H  •  Vancomycin HCl (VANCOCIN) 500 mg in sodium chloride 0.9% 150 mL IVPB, 500 mg, Intravenous, See Admin Instructions (RX holding)  •  HYDROmorphone HCl (DILAUDID) 1 MG/ML injection 0.1 mg, 0.1 mg, Intravenous, Q4H P right great toe, vomiting,   fever, lethargy. FINDINGS:  Lucencies in the distal phalanx of the right great toe are noted. Cortical loss is identified. Mild bony destruction is noted. This is suspicious for osteomyelitis.         Dictated by: Colleen Lucas bedside utilizing sterile hemostat. Betadine soaked gauze then applied to right hallux. Upon removal of the right hallux nail there is no purulence or bleeding noted.   Nursing to continue with daily Betadine soaked gauze dressing changes to the right luna

## 2020-02-08 NOTE — PROGRESS NOTES
[facility]  Nephrology Progress Note    Nessa Paula.  Patient Status:  Inpatient    10/18/1940 MRN [id number]   [facility] 4SW-A Attending Norma Fuentes MD   Hosp Day # 2 PCP Savannah Nix MD       SUBJECTIVE:  Pt toe dressing intact  Neurologic:  moving all extremities  Skin: Warm and dry, no rash        Labs:     Recent Labs   Lab 02/06/20  1836 02/07/20  0444 02/08/20  0427   WBC 18.3* 16.3* 13.0*   HGB 8.5* 7.5* 7.2*   MCV 72.4* 73.2* 73.9*   .0* 449.0 45 chewable tab 4 tablet, 4 tablet, Oral, Q15 Min PRN    Or  dextrose 50 % injection 50 mL, 50 mL, Intravenous, Q15 Min PRN    Or  glucose (DEX4) oral liquid 30 g, 30 g, Oral, Q15 Min PRN    Or  Glucose-Vitamin C (DEX-4) chewable tab 8 tablet, 8 tablet, Oral,

## 2020-02-08 NOTE — PROGRESS NOTES
INFECTIOUS DISEASE PROGRESS NOTE    Jesse Ohara.  Patient Status:  Inpatient    10/18/1940 MRN [id number]   [facility] 4S arthralgias, new arthritis.   Neurological: Negative for headaches, dizziness  Physical Exam:    Temp:  [97.9 °F (36.6 °C)-99.4 °F (37.4 °C)] 97.9 °F (36.6 °C)  Pulse:  [] 97  Resp:  [10-27] 20  BP: ()/(19-84) 111/43  Patient Vitals for the past — 84 22 100 % —   02/07/20 2230 106/43 — — 89 23 100 % —   02/07/20 2200 110/41 — — 84 18 100 % —   02/07/20 2130 120/43 — — 81 17 100 % —   02/07/20 2100 111/39 — — 84 22 100 % —   02/07/20 2030 111/40 — — 87 12 100 % —   02/07/20 2000 114/35 98.2 °F (36. CHEST AP PORTABLE (CPT=71045)    TECHNIQUE: AP chest radiograph was obtained. COMPARISON: [facility] , XR, XR CHEST AP PORTABLE (CPT=71045), 1/12/2020, 16:02.     INDICATIONS: cough    PATIENT STATED HISTORY: (As transcribed by Technologist) Patient recently (gastroesophageal reflux disease)     Mixed hyperlipidemia     History of DVT (deep vein thrombosis)     Anemia in ESRD (end-stage renal disease) (HCC)     Hyperglycemia     SVT (supraventricular tachycardia) (HCC)     Hypotension, unspecified hypotension

## 2020-02-08 NOTE — PLAN OF CARE
Received patient this am drowsy, opens eyes to name. More alert this afternoon. Mentation waxes and wanes. Weaned to RA-sats 97%. Lungs sound dim. BP low this am, 500ml bolus given. BP low again this afternoon, Levophed started.  Patient able to tolerate sm

## 2020-02-08 NOTE — SLP NOTE
ADULT SWALLOWING EVALUATION    ASSESSMENT    ASSESSMENT/OVERALL IMPRESSION:  Per MD note, patient is a 70year old male with PMH including but not limited to SVT, ESRD on HD, DVT, GIB, HTN, HL, pHTN, who p/t [facility] ED c toe infection.  Pt has had frequent admis Liquid: Thin                        Compensatory Strategies Recommended: Slow rate;Small bites and sips  Aspiration Precautions: Upright position;Small bites and sips; Slow rate  Medication Administration Recommendations: Crushed in puree  Treatment Plan/Re to eat and drink safely    SWALLOWING HISTORY  Current Diet Consistency: Mechanical soft ground; Thin liquids  Dysphagia History: Modified diet at baseline per daughter  Imaging Results: CXR 2/7- Stable mild elevation right diaphragm. Stable heart size.   Rosario Cage Multiple swallows with moderate assistance 90 % of the time across 3 sessions.   In Progress     FOLLOW UP  Treatment Plan/Recommendations: Dysphagia therapy  Number of Visits to Meet Established Goals: 3  Follow Up Needed: Yes  SLP Follow-up Date: 02/09/20

## 2020-02-08 NOTE — PROGRESS NOTES
[facility]  Patient Status:  Inpatient    10/18/1940 MRN [id number]   [facility] 4SW-A Attending Tiffany Glass MD   Hosp Day # 2 PCP Regina Johnson MD     Critical Care Progress Note     Date of Adm Glucose-Vitamin C (DEX-4) chewable tab 4 tablet, 4 tablet, Oral, Q15 Min PRN **OR** dextrose 50 % injection 50 mL, 50 mL, Intravenous, Q15 Min PRN **OR** glucose (DEX4) oral liquid 30 g, 30 g, Oral, Q15 Min PRN **OR** Glucose-Vitamin C (DEX-4) chewable tab 26.4 02/08/2020    MCV 73.9 02/08/2020    MCH 20.2 02/08/2020    MCHC 27.3 02/08/2020    RDW 22.3 02/08/2020    .0 02/08/2020     Lab Results   Component Value Date     02/08/2020    K 4.9 02/08/2020     02/08/2020    CO2 26.0 02/08/2020

## 2020-02-08 NOTE — PROGRESS NOTES
[facility] Hospitalist Progress Note     PCP: Andres Roberts MD    Chief Complaint: follow-up    Overnight/Interim Events:      SUBJECTIVE:  Pt reports pain comes and goes.  105/58, 101    OBJECTIVE:  Temp:  [97.9 °F (36.6 °C)-99.4 °F (37.4 °C)] 99 °F (37.2 CL 98 104 108   CO2 32.0 32.0 26.0   BUN 32* 38* 47*   CREATSERUM 5.34* 5.68* 6.62*   CA 9.9 8.9 8.5   MG  --  2.4  --    PHOS  --  2.2*  --    * 203* 157*       Recent Labs   Lab 02/06/20  1836 02/07/20  0444 02/08/20  0427   ALT 77* 58 61   AST secondary to DM and HTN  - nephrology following   - HD per renal     # FEN  -mod malnutrition, apprec dietary recs  -SLP for swallow eval     Proph  -sqh     Dispo: ICU  -palliative care following, pt has multiple medical issues, hospice appropriate

## 2020-02-08 NOTE — PROGRESS NOTES
[facility] Cardiology Progress Note - Steven Cobb.  Patient Status:  Inpatient    10/18/1940 MRN [id number]   [facility] 4SW-A Attending Kathy Cm MD   Hosp Day # 2 PCP Brenda Spivey MD     Sub oz (54 kg)  02/06/20 1648 : 120 lb (54.4 kg)  02/06/20 1952 : 119 lb 14.9 oz (54.4 kg)  01/30/20 0526 : 128 lb 1.4 oz (58.1 kg)  01/29/20 0518 : 127 lb 6.8 oz (57.8 kg)  01/28/20 0500 : 123 lb 12.8 oz (56.2 kg)  01/27/20 0500 : 131 lb 2.8 oz (59.5 kg)  01/ ALT 77* 58 61   * 352* 285*   ALB 2.8* 2.2* 2.0*       No results for input(s): TROP in the last 168 hours.     Allergies:   No Known Allergies    Medications:  Vancomycin HCl (VANCOCIN) 500 mg in sodium chloride 0.9% 150 mL IVPB, 500 mg, Intraveno (NOVOLOG) 100 UNIT/ML vial 6 Units, 0.1 Units/kg, Subcutaneous, Once  LORazepam (ATIVAN) injection 0.25 mg, 0.25 mg, Intravenous, Once        ROS:  General Health: otherwise feels well, weight stable  Constitutiona: no recent fevers  Skin: denies any unusu

## 2020-02-08 NOTE — PLAN OF CARE
Problem: METABOLIC/FLUID AND ELECTROLYTES - ADULT  Goal: Hemodynamic stability and optimal renal function maintained  Description  INTERVENTIONS:  - Monitor labs and assess for signs and symptoms of volume excess or deficit  - Monitor intake, output and

## 2020-02-08 NOTE — PLAN OF CARE
RECEIVED PATIENT RESTING IN BED. FAMILY AT BEDSIDE. PATIENT ALERT. ORIENTED TO PERSON. FOLLOW SOME SIMPLE COMMANDS. LUNGS DIMINISHED WITH O2 SATS % ON ROOM AIR. TITRATING LEVO DRIP TO MAINTAIN SBP >90 OR MAP >60.   PATIENT TOLERATING SMALL AMOUN

## 2020-02-09 NOTE — SLP NOTE
SPEECH DAILY NOTE - INPATIENT    ASSESSMENT & PLAN   ASSESSMENT  Pt seen for monitor of diet consistency tolerance. Per the pt's RN, pt with decreased appetite, however ate eggs and had some Ensure this am without difficulty.  Pt's family was meeting with vishal

## 2020-02-09 NOTE — PROGRESS NOTES
Pt arrived to floor from icu. Pt alert to person. Family at bedside. Pt has no complaints of pain. heplock x2 pt has av fistula and was fed by the daughter. Pt is on isolation for mersa in toe wound. lungs diminished and abdomen soft.

## 2020-02-09 NOTE — PLAN OF CARE
Patient alert, disoriented to situation and time. Tolerating room air. VSS. Remains off of levophed. Repositioning every two hours. Daughter at bedside overnight. Will continue to monitor.

## 2020-02-09 NOTE — PROGRESS NOTES
[facility]  Nephrology Progress Note    Itzel Kuhn.  Patient Status:  Inpatient    10/18/1940 MRN [id number]   [facility] 4SW-A Attending Kat Bolanos MD   Hosp Day # 3 PCP Quin Hernandez MD       SUBJECTIVE:  A organomegaly  Extremities: Without clubbing, cyanosis or edema. R great toe dressing intact.   R AVF with bruit/thrill  Neurologic:  moving all extremities  Skin: Warm and dry, no rash        Labs:     Recent Labs   Lab 02/06/20  1836 02/07/20  0444 02/08/2 100 mL, 100 mL, Intravenous, PRN  Piperacillin Sod-Tazobactam So (ZOSYN) 3.375 g in dextrose 5 % 100 mL ADD-vantage, 3.375 g, Intravenous, Q12H  HYDROmorphone HCl (DILAUDID) 1 MG/ML injection 0.1 mg, 0.1 mg, Intravenous, Q4H PRN  glucose (DEX4) oral liquid

## 2020-02-09 NOTE — PROGRESS NOTES
[facility] Pharmacy Note: Route Optimization for Levetiracetam (KEPPRA)    Patient is currently on Levetiracetam (KEPPRA) 250 mg IV every 12 hours.    The patient meets the criteria to convert to the oral equivalent as established by the IV to Oral conversion prot

## 2020-02-09 NOTE — PROGRESS NOTES
[address] Cardiology  Progress Note    Kaveh Mccullough.  Patient Status:  Inpatient    10/18/1940 MRN [id number]   [facility] 4SW-A Attending Gianni Moran MD   Hosp Day # 3 PCP Nadya Shah MD     Outpatient C * 203* 157* 150*       Recent Labs   Lab 02/06/20  1836 02/07/20  0444 02/08/20  0427   ALT 77* 58 61   * 352* 285*   ALB 2.8* 2.2* 2.0*       Recent Labs   Lab 02/08/20  0756 02/08/20  1142 02/08/20  1715 02/08/20  2042 02/09/20  0739   PG plan of care with Dr. Geri Batista, NP  2/9/2020  9:09 AM

## 2020-02-09 NOTE — PROGRESS NOTES
[facility] Hospitalist Progress Note     PCP: Quin Hernandez MD    Chief Complaint: follow-up    Overnight/Interim Events:      SUBJECTIVE:  Pt laying in bed, feeling ok.      OBJECTIVE:  Temp:  [98.3 °F (36.8 °C)-99.5 °F (37.5 °C)] 98.7 °F (37.1 °C)  Pulse: 02/07/20  0444 02/08/20  0427 02/09/20  0527    141 141 138   K 3.6 3.5 4.9 3.3*   CL 98 104 108 100   CO2 32.0 32.0 26.0 25.0   BUN 32* 38* 47* 21*   CREATSERUM 5.34* 5.68* 6.62* 3.98*   CA 9.9 8.9 8.5 8.7   MG  --  2.4  --   --    PHOS  --  2.2*  - osteomyelitis  - podiatry and vascular surgery following   - IV abx   -norco/dilaudid for pain, try to minimize for confusion     # Anemia 2/2 chronic dz/CKD  - IV iron weekly x 5 weeks  - f/u with hematology     # hx HTN, HypoTN  # Hx SVT  - Appreciate ca

## 2020-02-09 NOTE — PHYSICAL THERAPY NOTE
Attempted to see pt. Chart reviewed. Pt sleeping and pt's daughter requesting that we let him sleep. Pt with significant decline since Dec 2019.  Daughter states that since returning home after last admit, pt has been transferred between surfaces by lifting

## 2020-02-09 NOTE — PLAN OF CARE
Pt seen for monitor of diet consistency tolerance. Pt with no overt s/s of aspiration with thins. Will recommend to continue ground consistency with thin liquids. RN aware.

## 2020-02-09 NOTE — PLAN OF CARE
Received patient this morning A&O to self and place, on RA and Levo gtt. Levo was able to be shut off @ 1400 and has remained off for the rest of the day.  Patient awake enough for speech to evaluate today and recommending ground consistency, thin liquids -

## 2020-02-09 NOTE — PROGRESS NOTES
[facility]  Patient Status:  Inpatient    10/18/1940 MRN [id number]   [facility] 4SW-A Attending Isabella Bills MD   Hosp Day # 3 PCP Gissel Lu MD     Critical Care Progress Note     Date of Adm So (ZOSYN) 3.375 g in dextrose 5 % 100 mL ADD-vantage, 3.375 g, Intravenous, Q12H  •  HYDROmorphone HCl (DILAUDID) 1 MG/ML injection 0.1 mg, 0.1 mg, Intravenous, Q4H PRN  •  glucose (DEX4) oral liquid 15 g, 15 g, Oral, Q15 Min PRN **OR** Glucose-Vitamin C BS.                          Extremity: No clubbing or cyanosis.  no edema                          Skin: right toe dressing in place     Lab Results   Component Value Date    WBC 12.3 02/09/2020    RBC 3.58 02/09/2020    HGB 7.5 02/09/2020    HCT 26.4 02/0

## 2020-02-10 NOTE — PROGRESS NOTES
INFECTIOUS DISEASE PROGRESS NOTE    Jeyson Wong.  Patient Status:  Inpatient    10/18/1940 MRN [id number]   [facility] 4SW-A Attending Margorie Essex, MD   [facility] Day # 4 Q15 Min PRN **OR** Glucose-Vitamin C (DEX-4) chewable tab 4 tablet, 4 tablet, Oral, Q15 Min PRN **OR** dextrose 50 % injection 50 mL, 50 mL, Intravenous, Q15 Min PRN **OR** glucose (DEX4) oral liquid 30 g, 30 g, Oral, Q15 Min PRN **OR** Glucose-Vitamin C ( 451.0* 449.0     Recent Labs   Lab 02/06/20  1836 02/07/20  0444 02/08/20  0427 02/09/20  0527   * 203* 157* 150*   BUN 32* 38* 47* 21*   CREATSERUM 5.34* 5.68* 6.62* 3.98*   GFRAA 11* 10* 9* 16*   GFRNAA 10* 9* 7* 14*   CA 9.9 8.9 8.5 8.7   ALB 2.8 purulence)  - s/p recent PTA right AT/peroneal arteries (1/16/20)    4. abnl LFTS- suspect due to hypotension/ sepsis, US ok    5. esrd on HD    PLAN:    - cont vanco for now, as R brady cx had grown MRSA before  - d/c zosyn since no other infection ident

## 2020-02-10 NOTE — PROGRESS NOTES
Patient received dialysis this am tolerated well. Patient family at bedside and condition report and plan of care reviewed.  Patient appetite is poor this am .

## 2020-02-10 NOTE — PROGRESS NOTES
[facility] Cardiology Progress Note        Gloria Titusdmont.  Patient Status:  Inpatient    10/18/1940 MRN [id number]   [facility] 4NW-A Attending Lauren Sal MD   [address] Day # 4 PCP Tito Carter MD bruits. Cardiac: Regular S1S2. No S3, S4, rub, click. 2/6 systolic   murmur. Lungs: Clear to auscultation and percussion. Abdomen: Soft, non-tender. Extremities: No LE edema. No clubbing or cyanosis.     Neurologic: Alert and oriented, normal affect calcified leaflets. Cusp separation was reduced.     There was moderate stenosis. Trivial regurgitation. Peak velocity (S):     331cm/sec. Mean gradient (S): 19mm Hg. Peak gradient (S): 44mm Hg. Valve     area (Vmax): 1.25cm^2.  4. Mitral valve:  Moderately

## 2020-02-10 NOTE — PROGRESS NOTES
[facility]  Nephrology Progress Note    Jesse Ohara.  Patient Status:  Inpatient    10/18/1940 MRN [id number]   [facility] 4SW-A Attending Isabella Bills MD   [address] Day # 4 PCP Gissel Lu MD       SUBJECTIVE:  Daniella Correa moving all extremities  Skin: Warm and dry, no rash        Labs:     Recent Labs   Lab 02/06/20  1836 02/07/20  0444 02/08/20  0427 02/09/20  0527   WBC 18.3* 16.3* 13.0* 12.3*   HGB 8.5* 7.5* 7.2* 7.5*   MCV 72.4* 73.2* 73.9* 73.7*   .0* 449.0 451. infusion, 0.5-30 mcg/min, Intravenous, Continuous  Piperacillin Sod-Tazobactam So (ZOSYN) 3.375 g in dextrose 5 % 100 mL ADD-vantage, 3.375 g, Intravenous, Q12H  HYDROmorphone HCl (DILAUDID) 1 MG/ML injection 0.1 mg, 0.1 mg, Intravenous, Q4H PRN  glucose (

## 2020-02-10 NOTE — PROGRESS NOTES
[address] [facility] 149 Follow Up    Jeniffer Montoya.  Patient Status:  Inpatient    10/18/1940 MRN [id number]   [facility] ([place], [place]) 4NW-A Attending Adriana Rodarte MD   [facility] Day # 4 PCP Itzel Correa MD     Date of visit: Consciousness   100 Full Normal Full   Normal Full   90 Full No disease  Normal Full Normal Full   80 Full Some disease  Normal w/effort Full Normal or  Reduced Full   70 Reduced Some disease  Can't perform job Full Normal or   Reduced Full   60 Reduced Si educated on Medicare coverage and process.   - [facility] rep also answered additional questions and concerns of Roxana by phone.  - [facility] plan to deliver DME and meds tomorrow, Tues 2/11 pending confirmation of a time when someone will be available to ac unspecified type     Sepsis with acute organ dysfunction, due to unspecified organism, unspecified type, unspecified whether septic shock present ([facility] [address]     Chronic anemia     ESRD (end stage renal disease) on dialysis [facility])      Palliative Care Recommendat

## 2020-02-10 NOTE — PLAN OF CARE
Pt on tele monitor showing SR.   Problem: CARDIOVASCULAR - ADULT  Goal: Maintains optimal cardiac output and hemodynamic stability  Description  INTERVENTIONS:  - Monitor vital signs, rhythm, and trends  - Monitor for bleeding, hypotension and signs of decr soft,bs present. Pt needs to be feed with each meals. Meds also needs to ne crush and mix with apple sauce.   Problem: METABOLIC/FLUID AND ELECTROLYTES - ADULT  Goal: Glucose maintained within prescribed range  Description  INTERVENTIONS:  - Monitor Blood Glu

## 2020-02-10 NOTE — PHYSICAL THERAPY NOTE
PHYSICAL THERAPY EVALUATION - INPATIENT     Room Number: 426/426-A  Evaluation Date: 2/10/2020  Type of Evaluation: Initial  Physician Order: PT Eval and Treat    Presenting Problem: sepsis  Reason for Therapy: Mobility Dysfunction and Discharge Plan Laterality Date   • COLECTOMY     • COLONOSCOPY N/A 5/2/2019    Performed by Wyline Ormond, MD at [facility] ENDOSCOPY   • ESOPHAGOGASTRODUODENOSCOPY (EGD) N/A 5/1/2019    Performed by Wyline Ormond, MD at [facility] ENDOSCOPY   • OTHER SURGICAL HISTORY      placement o Standing: Not tested       ADDITIONAL TESTS                                    NEUROLOGICAL FINDINGS                      ACTIVITY TOLERANCE                         O2 WALK                  AM-PAC '6-Clicks' INPATIENT SHORT FORM - BASIC MOBILITY  How much activity tolerance, balance, strength. Functional outcome measures completed include AMPAC. Based on this evaluation, patient's clinical presentation is stable and overall the evaluation complexity is considered low.   These impairments and comorbidities

## 2020-02-10 NOTE — CM/SW NOTE
LESLEY spoke w/Jose from [place] who stated the family is still considering their options. They are wanting to see if the pt can get hospice without having to stop dialysis. Josselyn Villegas stated they are looking into this. Will follow up w/jose later today.

## 2020-02-10 NOTE — PROGRESS NOTES
[facility] Hospitalist Progress Note     PCP: Carmen Santana MD    Chief Complaint: follow-up    Overnight/Interim Events:      SUBJECTIVE:  Pt laying in bed, asking where he is. Just got HD. Reports pain ok.      OBJECTIVE:  Temp:  [98.4 °F (36.9 °C)-98.8 ° 02/08/20  0427 02/09/20  0527    141 141 138   K 3.6 3.5 4.9 3.3*   CL 98 104 108 100   CO2 32.0 32.0 26.0 25.0   BUN 32* 38* 47* 21*   CREATSERUM 5.34* 5.68* 6.62* 3.98*   CA 9.9 8.9 8.5 8.7   MG  --  2.4  --   --    PHOS  --  2.2*  --   --    GLU 2 dz/CKD  - IV iron weekly x 5 weeks  - f/u with hematology     # hx HTN, HypoTN  # Hx SVT  - Appreciate cardiology recs  - avoid BP lowering meds  - midodrine 10mg TID  - amiodarone     # ESRD secondary to DM and HTN  - nephrology following   - HD per renal

## 2020-02-10 NOTE — CM/SW NOTE
HAILEE met with Army Lugo to discuss discharge plan. She wants to speak with [facility] hospice prior to discharge to confirm appointment time. Edwina Ca would like to meet with Dr Andy Sandifer to discuss discharge ABT in detail.  HAILEE paged ID to relay the above

## 2020-02-10 NOTE — CM/SW NOTE
CM spoke with Hillary Barros (daughter) to discuss discharge plan- home with [facility] hospice 2/11/2020. Dialysis to continue on M-W-F at [facility]. Daughter and son in law transport patient to dialysis via car.    Dr Jo Cook called

## 2020-02-11 NOTE — PLAN OF CARE
Pt alert and oriented x2-3. Answering questions appropriately. Drowsy beginning of the shift, pt more alert now. VSS, afebrile. Denies pain or SOB. SpO2 within normal limits on room air. Sinus rhythm on tele. Denies nausea. Pts daughter at the bedside.  QID deep breathe, Incentive Spirometry  - Assess the need for suctioning and perform as needed  - Assess and instruct to report SOB or any respiratory difficulty  - Respiratory Therapy support as indicated  - Manage/alleviate anxiety  - Monitor for signs/sympt response to electrolyte replacements, including rhythm and repeat lab results as appropriate  - Fluid restriction as ordered  - Instruct patient on fluid and nutrition restrictions as appropriate  Outcome: Progressing  Goal: Hemodynamic stability and optim preferred)  - Offer food and liquids at a slow rate  - No straws  - Encourage small bites of food and small sips of liquid  - Offer pills one at a time, crush or deliver with applesauce as needed  - Discontinue feeding and notify MD (or speech pathologist)

## 2020-02-11 NOTE — CM/SW NOTE
CM met with Arjun Hernandez (daughter/ primary caregiver) this am to discuss discharge home with [facility] hospice today. Daughter was disgruntled with the communication process at [facility] and with Yumi Gomez.  CM sat with daughter to discuss wa

## 2020-02-11 NOTE — PLAN OF CARE
Problem: Impaired Swallowing  Goal: Minimize aspiration risk  Description  Interventions:  PUREE with THIN LIQUIDS  - Patient should be alert and upright for all feedings (90 degrees preferred)  - Offer food and liquids at a slow rate  - Straws ok  - Enc

## 2020-02-11 NOTE — PROGRESS NOTES
[facility]  Nephrology Progress Note    Nadiya Parish.  Patient Status:  Inpatient    10/18/1940 MRN [id number]   [facility] 4SW-A Attending Kathy Cm MD   Hosp Day # 5 PCP Brenda pSivey MD       SUBJECTIVE: injection 0.25 mg, 0.25 mg, Intravenous, Once        Physical Exam:   /45 (BP Location: Left arm)   Pulse 85   Temp 98.1 °F (36.7 °C) (Oral)   Resp 25   Ht 64\"   Wt 123 lb 10.9 oz (56.1 kg)   SpO2 97%   BMI 21.91 kg/m²   Temp (24hrs), Av.5 °F (3

## 2020-02-11 NOTE — CM/SW NOTE
CM spoke with Clari Panchal and Dr Concha Stokes to discuss discharge plan. Dr Concha Stokes to meet with daughter at 3 pm to go over discharge medication. [facility] scheduled for 4pm. Daughter informed of the above.      CM confirmed with [facility] they'

## 2020-02-11 NOTE — SLP NOTE
ADULT SWALLOWING EVALUATION    ASSESSMENT    ASSESSMENT/OVERALL IMPRESSION:  Orders were received for a re evaluation of swallowing following patient exhibiting difficulty with ground items and complaint of sore throat.  Family planning on discharge to [place] [id number] unspecified type    Sepsis with acute organ dysfunction, due to unspecified organism, unspecified type, unspecified whether septic shock present ([facility] [address]    Chronic anemia    ESRD (end stage renal disease) on dialysis [facility])      Past Medical History  Past Med Thin liquids;Puree(ground)  Method of Presentation: Self presentation;Spoon;Cup;Straw;Single sips  Patient Positioning: Upright;Midline    Oral Phase of Swallow: Impaired  Bolus Retrieval: Intact  Bilabial Seal: Intact  Bolus Formation: Impaired  Bolus Pro

## 2020-02-11 NOTE — PROGRESS NOTES
[place] [address] [place] [address] Cardiology Progress Note        Carline Damon.  Patient Status:  Inpatient    10/18/1940 MRN [id number]   [facility] 4NW-A Attending Howard Dominguez MD   [facility] Day # 5 PCP Chadwick Mays MD 85  Resp: 25  BP: 108/45  General:  Appears comfortable  HEENT: No focal deficits. Neck: No JVD, carotids 2+ no bruits. Cardiac: Regular S1S2. No S3, S4, rub, click. 2/6 systolic   murmur. Lungs: Clear to auscultation and percussion.   Abdomen: Soft, n est): 55mm Hg. 3. Aortic valve: Not well visualized. Probably trileaflet; moderately     thickened, moderately calcified leaflets. Cusp separation was reduced.     There was moderate stenosis. Trivial regurgitation. Peak velocity (S):     331cm/sec.  Mean

## 2020-02-11 NOTE — PROGRESS NOTES
INFECTIOUS DISEASE PROGRESS NOTE    Joaquín Zimmerman.  Patient Status:  Inpatient    10/18/1940 MRN [id number]   [facility] 4SW-A Attending Nicole Chavarria MD   [facility] Day # 5 Oral, Q15 Min PRN **OR** dextrose 50 % injection 50 mL, 50 mL, Intravenous, Q15 Min PRN **OR** glucose (DEX4) oral liquid 30 g, 30 g, Oral, Q15 Min PRN **OR** Glucose-Vitamin C (DEX-4) chewable tab 8 tablet, 8 tablet, Oral, Q15 Min PRN  •  Insulin Aspart P 02/09/20  0527   * 203* 157* 150*   BUN 32* 38* 47* 21*   CREATSERUM 5.34* 5.68* 6.62* 3.98*   GFRAA 11* 10* 9* 16*   GFRNAA 10* 9* 7* 14*   CA 9.9 8.9 8.5 8.7   ALB 2.8* 2.2* 2.0*  --     141 141 138   K 3.6 3.5 4.9 3.3*   CL 98 104 108 100 debilitated, with intermittent MS changes during a prolonged recent admission, w/o much improvement in spite of extensive w/u and treatments. He likely has underlying dementia. Agree with hospice care. d/w daughter today again.  They want to cont HD, will c

## 2020-02-11 NOTE — SLP NOTE
Plan is for patient to be discharged to hospice. Per most recent SLP note on 2/9/20, patient demonstrates tolerance of mechanical soft ground diet with thin liquids. Continue recommended diet upon discharge.    Will discharge from skilled dysphagia therapy

## 2020-02-13 NOTE — ED PROVIDER NOTES
Patient Seen in: [facility] Emergency Department      History   Patient presents with:  Altered Mental Status    Stated Complaint:     ATA Goff is a pleasant 72-year-old gentleman presenting to the emerge department for unresponsiveness.   Zafar Tobacco Use      Smoking status: Never Smoker      Smokeless tobacco: Never Used    Alcohol use: No      Alcohol/week: 0.0 standard drinks    Drug use: No             Review of Systems    Positive for stated complaint:   Other systems are as noted in HPI. (*)     .0 (*)     MCV 74.1 (*)     MCH 20.6 (*)     MCHC 27.7 (*)     RDW 23.1 (*)     RDW-SD 57.8 (*)     Neutrophil Absolute Prelim 15.26 (*)     All other components within normal limits   CBC WITH DIFFERENTIAL WITH PLATELET    Narrative:      Bernett Scheuermann pm    Follow-up:  Erik Nicholas MD  [address] [facility]  [phone]    In 1 week          Medications Prescribed:  Current Discharge Medication List

## 2020-02-13 NOTE — CM/SW NOTE
CM was contacted to assiste with placement to the [facility] inpatient unit. Spoke with [facility] and she states that the patient has been accepted to the [facility] unit. CM to call with ETA.     Spoke with daughter Syed Quiroga stating the the patie

## 2020-02-13 NOTE — ED NOTES
JUAN ANTONIO CONTI AT [facility] HOSPICE AND PALLIATIVE UPDATED WITH PT STATUS.

## 2020-02-13 NOTE — ED NOTES
[facility] FOR [facility], [facility] IS ON SCENE. RN GIVEN AN ADDITIONAL 20 MIN ETA.

## 2020-02-13 NOTE — ED NOTES
MD BEDSIDE, RESPIRATORY BEDSIDE.  PT REMAINS UNRESPONSIVE TO STAFF, DAUGHTER BEDSIDE WITH VALID DNR/DNI

## 2020-02-13 NOTE — ED NOTES
FAMILY MEMBERS BEDSIDE, ATTEMPTED TO CALL HOSPICE CARE FOR CONTINUATION OF CARE. CASE MANAGE, [address] [facility], [address], TO ASSIST IN PLACEMENT.  Nilton [address] CONTACT NUMBER [phone]

## 2020-02-13 NOTE — ED INITIAL ASSESSMENT (HPI)
Pt picked up by medics from dialysis unresponsive. Pinpoint pupils, narcan given, low cap reading. Low temp. .  DNR, DNI

## 2020-02-14 NOTE — DISCHARGE SUMMARY
[facility] Internal Medicine Discharge Summary   Patient ID:  Shalini Pinzon  [id number]  70year old  10/18/1940    Admit date: 2/6/2020    Discharge date and time: 2/11/2020     Attending Physician: No att. providers found     Primary Care Physician: Juvenal instructions provided not to exceed 4 grams acetaminophen within 24 hours from ALL sources. Also instructed not to take medication when sedated. . Understanding expressed  Patient instructed to seek medical attention if symptoms return, do not improve or ge to Get Your Medications      You can get these medications from any pharmacy    Bring a paper prescription for each of these medications  · HYDROcodone-acetaminophen 5-325 MG Tabs  · Vancomycin HCl 500 mg in sodium chloride 150 mL         Consults: MARIANA CONS common bile duct. Common bile duct diameter is 5.0 mm. No Cyr sign. PANCREAS:  Normal. SPLEEN:  Normal. KIDNEYS:  Multiple cysts are present bilateral kidney, including 19 mm upper pole right, 24 mm upper pole left.   Kidneys appear small and show incr PORTABLE  (CPT=71045), 2/06/2020, 18:31. INDICATIONS:  hypoxia  PATIENT STATED HISTORY: (As transcribed by Technologist)  Patient offered no additional history at this time. CONCLUSION:    Stable mild elevation right diaphragm. Stable heart size. to follow up with DR Jeanett Eisenmenger is 6, please follow up with Dr. Dane Pennington MD      Notify physician if you experience any of the followin. Fever  2. . persistent Nausea/vomiting  3. severe uncontrolled pain  4. redness, tenderness, o

## 2020-05-07 NOTE — ED NOTES
PATIENT'S WIFE [employer]  FAX # [phone]    FAX NUMBER TO FAX FORMS     PATIENT CALLBACK [phone]   Patient out of department for additional testing.

## 2022-02-21 NOTE — CONSULTS
If you are due for any health screening(s) below please notify me so we can arrange them to be ordered and scheduled to maintain your health.     Health Maintenance   Topic Date Due    Hepatitis C Screening  Never done    Lipid Panel  11/03/2025    TETANUS VACCINE  01/01/2026                        Pharmacy Dosing Service: Warfarin (Coumadin)  Carline Lockett is a 78year old male for whom pharmacy has been dosing warfarin (Coumadin).  Goal INR is 2-3    Recent Labs   Lab  04/08/18   1635  04/09/18   0514  04/10/18   1031   INR  2.51*  2.65*

## 2022-05-20 NOTE — SLP NOTE
Order received for BSE; chart reviewed, clinical d/w RN and met with Dtr. Pt s/p 2 swallow evals due to AMS. Pt con't to have waxing/waning mental status. Discussed with Dtr at length.  Pt had another altered state yesterday however has improved since th 3 = A little assistance

## (undated) DEVICE — REM POLYHESIVE ADULT PATIENT RETURN ELECTRODE: Brand: VALLEYLAB

## (undated) DEVICE — ENDOSCOPY PACK - LOWER: Brand: MEDLINE INDUSTRIES, INC.

## (undated) DEVICE — TRAP 4 CPTR CHMBR N EZ INLN

## (undated) DEVICE — SNARE CAPTIFLEX MICRO-OVL OLY

## (undated) DEVICE — 1200CC GUARDIAN II: Brand: GUARDIAN

## (undated) DEVICE — Device: Brand: DEFENDO AIR/WATER/SUCTION AND BIOPSY VALVE

## (undated) DEVICE — ENDOSCOPY PACK UPPER: Brand: MEDLINE INDUSTRIES, INC.

## (undated) DEVICE — FILTERLINE NASAL ADULT O2/CO2

## (undated) DEVICE — 3M™ RED DOT™ MONITORING ELECTRODE WITH FOAM TAPE AND STICKY GEL, 50/BAG, 20/CASE, 72/PLT 2570: Brand: RED DOT™

## (undated) DEVICE — SNARE CAPTIFLEX STD OVAL OLY

## (undated) NOTE — LETTER
[facility] [address], [address]  Consent for Procedure/Sedation    Date: 01/16/2020    Time: 1315      1.  I authorize the performance upon Jeremy Turk. the following:cardiac catheterization, left ventricular cineangio period, the physician will determine when the applicable recovery period ends for purposes of reinstating the Do Not Resuscitate (DNR) order.     Signature of Patient: ____________________________________________________    Signature of person authorized

## (undated) NOTE — LETTER
Michelle Phelps [address]  [place], [address] [place] [address]    Consent for Operation  Date: __________________                                Time: _______________    1.  I authorize the performance upon Jinny Lam. the following operation:  P procedure has been videotaped, the surgeon will obtain the original videotape. The hospital will not be responsible for storage or maintenance of this tape.   7. For the purpose of advancing medical education, I consent to the admittance of observers to the STATEMENTS REQUIRING INSERTION OR COMPLETION WERE FILLED IN.     Signature of Patient:   ___________________________    When the patient is a minor or mentally incompetent to give consent:  Signature of person authorized to consent for patient: ____________ supplements, and pills I can buy without a prescription (including street drugs/illegal medications). Failure to inform my anesthesiologist about these medicines may increase my risk of anesthetic complications. iv.  If I am allergic to anything or have ha Anesthesiologist Signature     Date   Time  I have discussed the procedure and information above with the patient (or patient’s representative) and answered their questions. The patient or their representative has agreed to have anesthesia services.     ___

## (undated) NOTE — LETTER
[facility] [address], [address]  Consent for Procedure/Sedation    Date:        Time:       1.  I authorize the performance upon Agata Lawrence. the following: Peripheral angiography, atherectomy, percutaneous translumina you may develop a skin reaction.       Signature of Patient: _______________________________________________________    Signature of person authorized                                           Relationship to  to consent for patient: _______________________

## (undated) NOTE — ED AVS SNAPSHOT
Hailey R[address] MRN: [id number]    Department:  [facility] Emergency Department   Date of Visit:  2/12/2020           Disclosure     Insurance plans vary and the physician(s) referred by the ER may not be covered by your plan.  Please con tell this physician (or your personal doctor if your instructions are to return to your personal doctor) about any new or lasting problems. The primary care or specialist physician will see patients referred from the [facility] Emergency Department.  Zac Ventura

## (undated) NOTE — LETTER
[facility] [address], [address]  Consent for Procedure/Sedation    Date: 01/16/2020    Time: 1315      1.  I authorize the performance upon Nora Miller. the following: Peripheral angiography, atherectomy, percutaneous you may develop a skin reaction.       Signature of Patient: _______________________________________________________    Signature of person authorized                                           Relationship to  to consent for patient: _______________________

## (undated) NOTE — LETTER
Michelle Phelps [address]  [place], [address] [facility]    Consent for Operation  Date: __________________                                Time: _______________    1.  I authorize the performance upon Faby Wolfe. the following operation:  a procedure has been videotaped, the surgeon will obtain the original videotape. The hospital will not be responsible for storage or maintenance of this tape.   6. For the purpose of advancing medical education, I consent to the admittance of observers to the STATEMENTS REQUIRING INSERTION OR COMPLETION WERE FILLED IN.     Signature of Patient:   ___________________________    When the patient is a minor or mentally incompetent to give consent:  Signature of person authorized to consent for patient: ____________ supplements, and pills I can buy without a prescription (including street drugs/illegal medications). Failure to inform my anesthesiologist about these medicines may increase my risk of anesthetic complications. iv.  If I am allergic to anything or have ha Anesthesiologist Signature     Date   Time  I have discussed the procedure and information above with the patient (or patient’s representative) and answered their questions. The patient or their representative has agreed to have anesthesia services.     ___

## (undated) NOTE — ED AVS SNAPSHOT
Hailey Rg 131 MRN: [id number]    Department:  [facility] Emergency Department   Date of Visit:  9/25/2018           Disclosure     Insurance plans vary and the physician(s) referred by the ER may not be covered by your plan.  Please con tell this physician (or your personal doctor if your instructions are to return to your personal doctor) about any new or lasting problems. The primary care or specialist physician will see patients referred from the [facility] Emergency Department.  Neymar Nguyễn

## (undated) NOTE — LETTER
Michelle Phelps [address] [facility], [address] [place] [address]    Consent for Operation  Date: ____4/30/2019______________                                Time: _____14:45__________    1.  I authorize the performance upon Jocelyne Diallo. the following revealed by the pictures or by descriptive texts accompanying them. If the procedure has been videotaped, the surgeon will obtain the original videotape. The hospital will not be responsible for storage or maintenance of this tape.   7. For the purpose of a THAT MY DOCTOR PROVIDED ME WITH THE ABOVE EXPLANATIONS, THAT ALL BLANKS OR STATEMENTS REQUIRING INSERTION OR COMPLETION WERE FILLED IN.     Signature of Patient:   ___________________________    When the patient is a minor or mentally incompetent to give co iii. All of the medicines I take (including prescriptions, herbal supplements, and pills I can buy without a prescription (including street drugs/illegal medications).  Failure to inform my anesthesiologist about these medicines may increase my risk of anes _____________________________________________________________________________  Anesthesiologist Signature     Date   Time  I have discussed the procedure and information above with the patient (or patient’s representative) and answered their questions.  The

## (undated) NOTE — ED AVS SNAPSHOT
Hailey Rg 131 MRN: [id number]    Department:  [facility] Emergency Department   Date of Visit:  11/20/2018           Disclosure     Insurance plans vary and the physician(s) referred by the ER may not be covered by your plan.  Please co tell this physician (or your personal doctor if your instructions are to return to your personal doctor) about any new or lasting problems. The primary care or specialist physician will see patients referred from the [facility] Emergency Department.  Shelton Lombard

## (undated) NOTE — LETTER
[address] [facility] FOR BLOOD OR BLOOD COMPONENTS      In the course of your treatment, it may become necessary to administer a transfusion of blood or blood components.  This form provides basic information concerning this proc explain the alternatives to you if it has not already been done. I,Joaquin Cutler,Asad AJ, have read/had read to me the above. I understand the matters bearing on the decision whether or not to authorize a transfusion of blood or blood components.  I have

## (undated) NOTE — IP AVS SNAPSHOT
[address]            (For Outpatient Use Only) Initial Admit Date: 4/30/2019   Inpt/Obs Admit Date: Inpt: 4/30/19 / Obs: N/A   Discharge Date:    Magy Perea:  [de-identified]   MRN: [de-identified]   CSN: [id number]   CEID: [id number] Subscriber Name:  Damaso Bill :    Subscriber ID:  Pt Rel to Subscriber:    Hospital Account Financial Class: Medicare    May 4, 2019

## (undated) NOTE — LETTER
Michelle Phelps 182 6 13[facility]  [place], [address] [place] [address]    Consent for Operation  Date: _______MAY 01, 2019___________                                Time: _______________    1.  I authorize the performance upon [facility] the following o revealed by the pictures or by descriptive texts accompanying them. If the procedure has been videotaped, the surgeon will obtain the original videotape. The hospital will not be responsible for storage or maintenance of this tape.   6. For the purpose of a THAT MY DOCTOR PROVIDED ME WITH THE ABOVE EXPLANATIONS, THAT ALL BLANKS OR STATEMENTS REQUIRING INSERTION OR COMPLETION WERE FILLED IN.     Signature of Patient:   ___________________________    When the patient is a minor or mentally incompetent to give co iii. All of the medicines I take (including prescriptions, herbal supplements, and pills I can buy without a prescription (including street drugs/illegal medications).  Failure to inform my anesthesiologist about these medicines may increase my risk of anes _____________________________________________________________________________  Anesthesiologist Signature     Date   Time  I have discussed the procedure and information above with the patient (or patient’s representative) and answered their questions.  The

## (undated) NOTE — LETTER
Michelle Phelps 182 6 13[facility]  [place], [address] [place] [address]    Consent for Operation  Date: __________________                                Time: _______________    1.  I authorize the performance upon Kaveh Mccullough. the following operation:  A procedure has been videotaped, the surgeon will obtain the original videotape. The hospital will not be responsible for storage or maintenance of this tape.   6. For the purpose of advancing medical education, I consent to the admittance of observers to the STATEMENTS REQUIRING INSERTION OR COMPLETION WERE FILLED IN.     Signature of Patient:   ___________________________    When the patient is a minor or mentally incompetent to give consent:  Signature of person authorized to consent for patient: ____________ supplements, and pills I can buy without a prescription (including street drugs/illegal medications). Failure to inform my anesthesiologist about these medicines may increase my risk of anesthetic complications. iv.  If I am allergic to anything or have ha Anesthesiologist Signature     Date   Time  I have discussed the procedure and information above with the patient (or patient’s representative) and answered their questions. The patient or their representative has agreed to have anesthesia services.     ___

## (undated) NOTE — ED AVS SNAPSHOT
Hailey Rg 131 MRN: [id number]    Department:  [facility] Emergency Department   Date of Visit:  1/19/2019           Disclosure     Insurance plans vary and the physician(s) referred by the ER may not be covered by your plan.  Please con tell this physician (or your personal doctor if your instructions are to return to your personal doctor) about any new or lasting problems. The primary care or specialist physician will see patients referred from the [facility] Emergency Department.  Cheryle Levins

## (undated) NOTE — IP AVS SNAPSHOT
Patient Demographics     Address  [address] [place] [address] [address] [id number] Phone  [phone] [facility])  [phone] (Mobile) *Preferred* E-mail Address  [email or web address]      Emergency Contact(s)     Name Relation Home Work Edward Take 1 capsule (2,000 Units total) by mouth daily.    Gwen Tena MD                  [id number] - MAR ACTION REPORT  (last 24 hrs)    ** SITE UNKNOWN **     Order ID Medication Name Action Time Action Reason Comments    [id number] 0.9%  NaCl infusion Result status: Final result   Ordering provider:  lEysia Whitlock MD  05/03/19 2305 Resulting lab:  [facility]    Specimen Information    Type Source Collected On   Blood — 05/04/19 3548          Components    Component Value Reference Range Flag Lab   G HGB 7.4 13.0 - 17.5 g/dL  [facility]            HEMOGLOBIN [[id number]] (Abnormal)  Resulted: 05/03/19 1718, Result status: Final result   Ordering provider:  Aletha Myers MD  05/02/19 2300 Resulting lab:  [facility]    Specimen Information    Type [facility] • NIDDM (non-insulin dependent diabetes mellitus)    • Pneumonia due to organism 2018   • Renal disorder    • Renal failure (ARF), acute on chronic [facility])    • [facility] ([facility]    • Stroke [facility])    • TIA (transient ischemic attack) 2010   • Visual imp Calcium Carbonate Antacid 500 MG Oral Chew Tab Chew 1 tablet by mouth daily as needed for Heartburn. Disp:  Rfl:[CM.2]          Soc Hx[CM. 1]  Social History    Tobacco Use      Smoking status: Never Smoker      Smokeless tobacco: Never Used    Alcohol use: Additional Diagnostics:       Radiology:   No results found. ASSESSMENT / PLAN:[CM.3     70year old[CM.2] male with hx ESRD on HD, HTN, HLD, SVT, prior CVA, dementia who presents after an episode of melena and anemia.     # GI bleed  # Anemia  - G MD at 19 1499 [facility]  Report of Consultation    Ana Maria Anderson.  Patient Status:  Inpatient    10/18/1940 MRN [id number]   [facility] 4NW-A Attending Toni Norton MD   Hosp Day # 0 PCP [facility] •  Metoprolol Tartrate (LOPRESSOR) tab 50 mg, 50 mg, Oral, 2x Daily(Beta Blocker)  •  [START ON 5/1/2019] rivastigmine (EXELON) 9.5 MG/24HR patch 1 patch, 1 patch, Transdermal, Daily  •  [START ON 5/1/2019] Pantoprazole Sodium (PROTONIX) EC tab 40 mg, 40 m INR 2.12 04/30/2019    PTP 25.1 04/30/2019       Glucose (mg/dL)   Date Value   08/14/2013 87     BUN (mg/dL)   Date Value   04/30/2019 37 (H)   11/20/2018 10   09/25/2018 9   03/15/2011 60 (H)   03/14/2011 89 (H)   03/13/2011 63 (H)     Blood Urea Nitrog Discharge Summary signed by Negin Rojas MD at 5/4/2019  3:32 PM  Version 1 of 1    Author:  Negin Rojas MD Service:  Internal Medicine Author Type:  Physician    Filed:  5/4/2019  3:32 PM Date of Service:  5/4/2019  3:29 PM Status:  Signed    Zach - pt not a good historian. Discussed with patient and son who is at bedside, they think this was start per neurology for stroke. Patient with hx SVT, but no afib. COUMADIN removed from medication list currently for transfer given bleeding.  But may need to omeprazole 20 MG Oral Capsule Delayed Release    Warfarin Sodium 2.5 MG Oral Tab      Follow-up with   PCP   GI    Total Time Coordinating Care: Greater than 30 minutes    Patient had opportunity to ask questions and state understand and agree with therape na    Therapy significant labs:[CK.1]  5/4/2019[CK. 2] Hgb = 7/2 (declining again and pt to receive 1 unit of blood).        Problem List  Principal Problem:    Melena  Active Problems:    Essential hypertension    Acute blood loss anemia      Past Medical H Fall Risk: High fall risk    WEIGHT BEARING RESTRICTION  Weight Bearing Restriction: None                PAIN ASSESSMENT  Ratin  Location: denies       COGNITION  · Overall Cognitive Status:  Impaired  · Arousal/Alertness:  delayed responses to stimuli -   Moving to and from a bed to a chair (including a wheelchair)?: Total   -   Need to walk in hospital room?: Total   -   Climbing 3-5 steps with a railing?: Total       AM-PAC Score:  Raw Score: 10   Approx Degree of Impairment: 76.75%   Standardized Sco Based on this evaluation, patient's clinical presentation is evolving and overall the evaluation complexity is considered low.     These impairments and comorbidities manifest themselves as functional limitations in independent bed mobility, transfers and g Version 1 of 1    Author:  Aura Leyva PT Service:  Nam Fallon Author Type:  Physical Therapist    Filed:  5/2/2019  1:14 PM Date of Service:  5/2/2019  1:13 PM Status:  Signed    :  Aura Leyva PT (Physical Therapist)       Attempted to see pt toda Occupational Therapy Note signed by Estella Britton at 5/2/2019  3:18 PM  Version 1 of 1    Author:  Estella Britton Service:  Rehab Author Type:  Occupational Therapist    Filed:  5/2/2019  3:18 PM Date of Service:  5/2/2019  3:18 PM Status:  Signed    Ed

## (undated) NOTE — LETTER
Michelle Phelps 182 6 13[facility]  [place], [address] [facility]    Consent for Operation  Date: __________________                                Time: _______________    1.  I authorize the performance upon Juan Latham the following operationangi procedure has been videotaped, the surgeon will obtain the original videotape. The hospital will not be responsible for storage or maintenance of this tape.   6. For the purpose of advancing medical education, I consent to the admittance of observers to the STATEMENTS REQUIRING INSERTION OR COMPLETION WERE FILLED IN.     Signature of Patient:   ___________________________    When the patient is a minor or mentally incompetent to give consent:  Signature of person authorized to consent for patient: ____________ supplements, and pills I can buy without a prescription (including street drugs/illegal medications). Failure to inform my anesthesiologist about these medicines may increase my risk of anesthetic complications. iv.  If I am allergic to anything or have ha Anesthesiologist Signature     Date   Time  I have discussed the procedure and information above with the patient (or patient’s representative) and answered their questions. The patient or their representative has agreed to have anesthesia services.     ___